# Patient Record
Sex: FEMALE | Race: WHITE | NOT HISPANIC OR LATINO | Employment: FULL TIME | URBAN - NONMETROPOLITAN AREA
[De-identification: names, ages, dates, MRNs, and addresses within clinical notes are randomized per-mention and may not be internally consistent; named-entity substitution may affect disease eponyms.]

---

## 2023-08-01 LAB
EXTERNAL ABO GROUPING: NORMAL
EXTERNAL ANTIBODY SCREEN: NEGATIVE
EXTERNAL HEPATITIS B SURFACE ANTIGEN: NEGATIVE
EXTERNAL RH FACTOR: POSITIVE
EXTERNAL RUBELLA QUALITATIVE: NORMAL
EXTERNAL SYPHILIS RPR SCREEN: NORMAL
HIV1 P24 AG SERPL QL IA: NORMAL

## 2024-02-15 ENCOUNTER — LAB (OUTPATIENT)
Dept: LAB | Facility: HOSPITAL | Age: 34
End: 2024-02-15
Payer: COMMERCIAL

## 2024-02-15 ENCOUNTER — TRANSCRIBE ORDERS (OUTPATIENT)
Dept: ADMINISTRATIVE | Facility: HOSPITAL | Age: 34
End: 2024-02-15
Payer: COMMERCIAL

## 2024-02-15 DIAGNOSIS — O24.415 GESTATIONAL DIABETES MELLITUS IN PREGNANCY, CONTROLLED BY ORAL HYPOGLYCEMIC DRUGS: Primary | ICD-10-CM

## 2024-02-15 DIAGNOSIS — O24.415 GESTATIONAL DIABETES MELLITUS IN PREGNANCY, CONTROLLED BY ORAL HYPOGLYCEMIC DRUGS: ICD-10-CM

## 2024-02-15 LAB
ALBUMIN SERPL-MCNC: 3.5 G/DL (ref 3.5–5.2)
ALBUMIN/GLOB SERPL: 1.1 G/DL
ALP SERPL-CCNC: 88 U/L (ref 39–117)
ALT SERPL W P-5'-P-CCNC: 16 U/L (ref 1–33)
ANION GAP SERPL CALCULATED.3IONS-SCNC: 10.8 MMOL/L (ref 5–15)
AST SERPL-CCNC: 14 U/L (ref 1–32)
BASOPHILS # BLD AUTO: 0.07 10*3/MM3 (ref 0–0.2)
BASOPHILS NFR BLD AUTO: 0.7 % (ref 0–1.5)
BILIRUB SERPL-MCNC: 0.3 MG/DL (ref 0–1.2)
BUN SERPL-MCNC: 9 MG/DL (ref 6–20)
BUN/CREAT SERPL: 14.1 (ref 7–25)
CALCIUM SPEC-SCNC: 9.1 MG/DL (ref 8.6–10.5)
CHLORIDE SERPL-SCNC: 106 MMOL/L (ref 98–107)
CO2 SERPL-SCNC: 21.2 MMOL/L (ref 22–29)
COLLECT DURATION TIME UR: 24 HRS
CREAT SERPL-MCNC: 0.64 MG/DL (ref 0.57–1)
CREAT UR-MCNC: 122.9 MG/DL
DEPRECATED RDW RBC AUTO: 43 FL (ref 37–54)
EGFRCR SERPLBLD CKD-EPI 2021: 120.6 ML/MIN/1.73
EOSINOPHIL # BLD AUTO: 0.16 10*3/MM3 (ref 0–0.4)
EOSINOPHIL NFR BLD AUTO: 1.5 % (ref 0.3–6.2)
ERYTHROCYTE [DISTWIDTH] IN BLOOD BY AUTOMATED COUNT: 14.3 % (ref 12.3–15.4)
GLOBULIN UR ELPH-MCNC: 3.3 GM/DL
GLUCOSE SERPL-MCNC: 108 MG/DL (ref 65–99)
HCT VFR BLD AUTO: 37.6 % (ref 34–46.6)
HGB BLD-MCNC: 12.4 G/DL (ref 12–15.9)
IMM GRANULOCYTES # BLD AUTO: 0.06 10*3/MM3 (ref 0–0.05)
IMM GRANULOCYTES NFR BLD AUTO: 0.6 % (ref 0–0.5)
LDH SERPL-CCNC: 130 U/L (ref 135–214)
LYMPHOCYTES # BLD AUTO: 1.44 10*3/MM3 (ref 0.7–3.1)
LYMPHOCYTES NFR BLD AUTO: 13.5 % (ref 19.6–45.3)
MCH RBC QN AUTO: 28 PG (ref 26.6–33)
MCHC RBC AUTO-ENTMCNC: 33 G/DL (ref 31.5–35.7)
MCV RBC AUTO: 84.9 FL (ref 79–97)
MONOCYTES # BLD AUTO: 0.65 10*3/MM3 (ref 0.1–0.9)
MONOCYTES NFR BLD AUTO: 6.1 % (ref 5–12)
NEUTROPHILS NFR BLD AUTO: 77.6 % (ref 42.7–76)
NEUTROPHILS NFR BLD AUTO: 8.31 10*3/MM3 (ref 1.7–7)
NRBC BLD AUTO-RTO: 0 /100 WBC (ref 0–0.2)
PLATELET # BLD AUTO: 238 10*3/MM3 (ref 140–450)
PMV BLD AUTO: 10.8 FL (ref 6–12)
POTASSIUM SERPL-SCNC: 4.8 MMOL/L (ref 3.5–5.2)
PROT 24H UR-MRATE: 172 MG/24HOURS (ref 0–150)
PROT ?TM UR-MCNC: 16.7 MG/DL
PROT SERPL-MCNC: 6.8 G/DL (ref 6–8.5)
PROT/CREAT UR: 135.9 MG/G CREA (ref 0–200)
RBC # BLD AUTO: 4.43 10*6/MM3 (ref 3.77–5.28)
SODIUM SERPL-SCNC: 138 MMOL/L (ref 136–145)
SPECIMEN VOL 24H UR: 1000 ML
URATE SERPL-MCNC: 5.4 MG/DL (ref 2.4–5.7)
WBC NRBC COR # BLD AUTO: 10.69 10*3/MM3 (ref 3.4–10.8)

## 2024-02-15 PROCEDURE — 84156 ASSAY OF PROTEIN URINE: CPT

## 2024-02-15 PROCEDURE — 81050 URINALYSIS VOLUME MEASURE: CPT

## 2024-02-15 PROCEDURE — 36415 COLL VENOUS BLD VENIPUNCTURE: CPT

## 2024-02-15 PROCEDURE — 85025 COMPLETE CBC W/AUTO DIFF WBC: CPT

## 2024-02-15 PROCEDURE — 80053 COMPREHEN METABOLIC PANEL: CPT

## 2024-02-15 PROCEDURE — 82570 ASSAY OF URINE CREATININE: CPT

## 2024-02-15 PROCEDURE — 83615 LACTATE (LD) (LDH) ENZYME: CPT

## 2024-02-15 PROCEDURE — 84550 ASSAY OF BLOOD/URIC ACID: CPT

## 2024-02-21 LAB — EXTERNAL GROUP B STREP ANTIGEN: POSITIVE

## 2024-02-26 ENCOUNTER — HOSPITAL ENCOUNTER (OUTPATIENT)
Facility: HOSPITAL | Age: 34
Discharge: HOME | End: 2024-02-26
Attending: OBSTETRICS & GYNECOLOGY | Admitting: OBSTETRICS & GYNECOLOGY
Payer: COMMERCIAL

## 2024-02-26 VITALS
TEMPERATURE: 97.3 F | SYSTOLIC BLOOD PRESSURE: 148 MMHG | HEIGHT: 64 IN | WEIGHT: 290.7 LBS | BODY MASS INDEX: 49.63 KG/M2 | RESPIRATION RATE: 18 BRPM | DIASTOLIC BLOOD PRESSURE: 82 MMHG | HEART RATE: 88 BPM

## 2024-02-26 PROBLEM — I10 HTN (HYPERTENSION): Status: ACTIVE | Noted: 2024-02-26

## 2024-02-26 LAB
ALBUMIN SERPL-MCNC: 3.4 G/DL (ref 3.5–5.2)
ALBUMIN/GLOB SERPL: 1.1 G/DL
ALP SERPL-CCNC: 98 U/L (ref 39–117)
ALT SERPL W P-5'-P-CCNC: 11 U/L (ref 1–33)
ANION GAP SERPL CALCULATED.3IONS-SCNC: 11.5 MMOL/L (ref 5–15)
AST SERPL-CCNC: 13 U/L (ref 1–32)
BILIRUB SERPL-MCNC: 0.2 MG/DL (ref 0–1.2)
BILIRUB UR QL STRIP: NEGATIVE
BUN SERPL-MCNC: 7 MG/DL (ref 6–20)
BUN/CREAT SERPL: 12.5 (ref 7–25)
CALCIUM SPEC-SCNC: 10.9 MG/DL (ref 8.6–10.5)
CHLORIDE SERPL-SCNC: 104 MMOL/L (ref 98–107)
CLARITY UR: CLEAR
CO2 SERPL-SCNC: 23.5 MMOL/L (ref 22–29)
COLOR UR: YELLOW
CREAT SERPL-MCNC: 0.56 MG/DL (ref 0.57–1)
DEPRECATED RDW RBC AUTO: 47 FL (ref 37–54)
EGFRCR SERPLBLD CKD-EPI 2021: 124.5 ML/MIN/1.73
ERYTHROCYTE [DISTWIDTH] IN BLOOD BY AUTOMATED COUNT: 15 % (ref 12.3–15.4)
GLOBULIN UR ELPH-MCNC: 3 GM/DL
GLUCOSE SERPL-MCNC: 90 MG/DL (ref 65–99)
GLUCOSE UR STRIP-MCNC: NEGATIVE MG/DL
HCT VFR BLD AUTO: 37.5 % (ref 34–46.6)
HGB BLD-MCNC: 12.7 G/DL (ref 12–15.9)
HGB UR QL STRIP.AUTO: NEGATIVE
KETONES UR QL STRIP: NEGATIVE
LDH SERPL-CCNC: 187 U/L (ref 135–214)
LEUKOCYTE ESTERASE UR QL STRIP.AUTO: NEGATIVE
MCH RBC QN AUTO: 29.6 PG (ref 26.6–33)
MCHC RBC AUTO-ENTMCNC: 33.9 G/DL (ref 31.5–35.7)
MCV RBC AUTO: 87.4 FL (ref 79–97)
NITRITE UR QL STRIP: NEGATIVE
PH UR STRIP.AUTO: 7.5 [PH] (ref 5–8)
PLATELET # BLD AUTO: 198 10*3/MM3 (ref 140–450)
PMV BLD AUTO: 11.1 FL (ref 6–12)
POTASSIUM SERPL-SCNC: 5.1 MMOL/L (ref 3.5–5.2)
PROT SERPL-MCNC: 6.4 G/DL (ref 6–8.5)
PROT UR QL STRIP: NEGATIVE
RBC # BLD AUTO: 4.29 10*6/MM3 (ref 3.77–5.28)
SODIUM SERPL-SCNC: 139 MMOL/L (ref 136–145)
SP GR UR STRIP: 1.01 (ref 1–1.03)
URATE SERPL-MCNC: 5.4 MG/DL (ref 2.4–5.7)
UROBILINOGEN UR QL STRIP: NORMAL
WBC NRBC COR # BLD AUTO: 10.6 10*3/MM3 (ref 3.4–10.8)

## 2024-02-26 PROCEDURE — 87086 URINE CULTURE/COLONY COUNT: CPT | Performed by: OBSTETRICS & GYNECOLOGY

## 2024-02-26 PROCEDURE — 59025 FETAL NON-STRESS TEST: CPT

## 2024-02-26 PROCEDURE — 36415 COLL VENOUS BLD VENIPUNCTURE: CPT | Performed by: OBSTETRICS & GYNECOLOGY

## 2024-02-26 PROCEDURE — 83615 LACTATE (LD) (LDH) ENZYME: CPT | Performed by: OBSTETRICS & GYNECOLOGY

## 2024-02-26 PROCEDURE — 85027 COMPLETE CBC AUTOMATED: CPT | Performed by: OBSTETRICS & GYNECOLOGY

## 2024-02-26 PROCEDURE — G0463 HOSPITAL OUTPT CLINIC VISIT: HCPCS

## 2024-02-26 PROCEDURE — 80053 COMPREHEN METABOLIC PANEL: CPT | Performed by: OBSTETRICS & GYNECOLOGY

## 2024-02-26 PROCEDURE — 81003 URINALYSIS AUTO W/O SCOPE: CPT | Performed by: OBSTETRICS & GYNECOLOGY

## 2024-02-26 PROCEDURE — 84550 ASSAY OF BLOOD/URIC ACID: CPT | Performed by: OBSTETRICS & GYNECOLOGY

## 2024-02-26 RX ORDER — PRENATAL VIT/IRON FUM/FOLIC AC 27MG-0.8MG
1 TABLET ORAL NIGHTLY
COMMUNITY

## 2024-02-26 NOTE — DISCHARGE INSTR - ACTIVITY
RETURN TO LABOR AND DELIVERY TOMORROW AT 4 PM WITH 24 HOUR URINE COLLECTION. MONITOR FETAL KICK COUNTS AND RETURN IF YOU NOTICE A DECREASE IN FETAL MOVEMENT.

## 2024-02-27 ENCOUNTER — PREP FOR SURGERY (OUTPATIENT)
Dept: OTHER | Facility: HOSPITAL | Age: 34
End: 2024-02-27
Payer: COMMERCIAL

## 2024-02-27 ENCOUNTER — HOSPITAL ENCOUNTER (INPATIENT)
Facility: HOSPITAL | Age: 34
LOS: 2 days | Discharge: HOME | End: 2024-03-01
Attending: OBSTETRICS & GYNECOLOGY | Admitting: OBSTETRICS & GYNECOLOGY
Payer: COMMERCIAL

## 2024-02-27 DIAGNOSIS — O36.63X0 MACROSOMIA OF FETUS AFFECTING MANAGEMENT OF MOTHER IN THIRD TRIMESTER, SINGLE OR UNSPECIFIED FETUS: Primary | ICD-10-CM

## 2024-02-27 DIAGNOSIS — O16.3 HYPERTENSION AFFECTING PREGNANCY IN THIRD TRIMESTER: Primary | ICD-10-CM

## 2024-02-27 PROBLEM — O16.9 HYPERTENSION AFFECTING PREGNANCY: Status: ACTIVE | Noted: 2024-02-27

## 2024-02-27 LAB
ABO GROUP BLD: NORMAL
ABO GROUP BLD: NORMAL
ALBUMIN SERPL-MCNC: 3.3 G/DL (ref 3.5–5.2)
ALBUMIN/GLOB SERPL: 1.1 G/DL
ALP SERPL-CCNC: 86 U/L (ref 39–117)
ALT SERPL W P-5'-P-CCNC: 10 U/L (ref 1–33)
AMPHET+METHAMPHET UR QL: NEGATIVE
AMPHETAMINES UR QL: NEGATIVE
ANION GAP SERPL CALCULATED.3IONS-SCNC: 10.6 MMOL/L (ref 5–15)
APTT PPP: 26.3 SECONDS (ref 26.5–34.5)
AST SERPL-CCNC: 15 U/L (ref 1–32)
BARBITURATES UR QL SCN: NEGATIVE
BASOPHILS # BLD AUTO: 0.06 10*3/MM3 (ref 0–0.2)
BASOPHILS NFR BLD AUTO: 0.6 % (ref 0–1.5)
BENZODIAZ UR QL SCN: NEGATIVE
BILIRUB SERPL-MCNC: 0.2 MG/DL (ref 0–1.2)
BLD GP AB SCN SERPL QL: NEGATIVE
BUN SERPL-MCNC: 7 MG/DL (ref 6–20)
BUN/CREAT SERPL: 10.9 (ref 7–25)
BUPRENORPHINE SERPL-MCNC: NEGATIVE NG/ML
CALCIUM SPEC-SCNC: 9.1 MG/DL (ref 8.6–10.5)
CANNABINOIDS SERPL QL: NEGATIVE
CHLORIDE SERPL-SCNC: 103 MMOL/L (ref 98–107)
CO2 SERPL-SCNC: 21.4 MMOL/L (ref 22–29)
COCAINE UR QL: NEGATIVE
COLLECT DURATION TIME UR: 24 HRS
CREAT SERPL-MCNC: 0.64 MG/DL (ref 0.57–1)
DEPRECATED RDW RBC AUTO: 49.2 FL (ref 37–54)
EGFRCR SERPLBLD CKD-EPI 2021: 120.6 ML/MIN/1.73
EOSINOPHIL # BLD AUTO: 0.12 10*3/MM3 (ref 0–0.4)
EOSINOPHIL NFR BLD AUTO: 1.1 % (ref 0.3–6.2)
ERYTHROCYTE [DISTWIDTH] IN BLOOD BY AUTOMATED COUNT: 15.3 % (ref 12.3–15.4)
FENTANYL UR-MCNC: NEGATIVE NG/ML
FIBRINOGEN PPP-MCNC: 636 MG/DL (ref 173–524)
GLOBULIN UR ELPH-MCNC: 2.9 GM/DL
GLUCOSE BLDC GLUCOMTR-MCNC: 116 MG/DL (ref 70–130)
GLUCOSE BLDC GLUCOMTR-MCNC: 86 MG/DL (ref 70–130)
GLUCOSE SERPL-MCNC: 85 MG/DL (ref 65–99)
HCT VFR BLD AUTO: 36.6 % (ref 34–46.6)
HGB BLD-MCNC: 12.1 G/DL (ref 12–15.9)
IMM GRANULOCYTES # BLD AUTO: 0.05 10*3/MM3 (ref 0–0.05)
IMM GRANULOCYTES NFR BLD AUTO: 0.5 % (ref 0–0.5)
INR PPP: 0.94 (ref 0.9–1.1)
LYMPHOCYTES # BLD AUTO: 1.61 10*3/MM3 (ref 0.7–3.1)
LYMPHOCYTES NFR BLD AUTO: 14.9 % (ref 19.6–45.3)
MCH RBC QN AUTO: 29.7 PG (ref 26.6–33)
MCHC RBC AUTO-ENTMCNC: 33.1 G/DL (ref 31.5–35.7)
MCV RBC AUTO: 89.7 FL (ref 79–97)
METHADONE UR QL SCN: NEGATIVE
MONOCYTES # BLD AUTO: 0.71 10*3/MM3 (ref 0.1–0.9)
MONOCYTES NFR BLD AUTO: 6.6 % (ref 5–12)
NEUTROPHILS NFR BLD AUTO: 76.3 % (ref 42.7–76)
NEUTROPHILS NFR BLD AUTO: 8.27 10*3/MM3 (ref 1.7–7)
NRBC BLD AUTO-RTO: 0 /100 WBC (ref 0–0.2)
OPIATES UR QL: NEGATIVE
OXYCODONE UR QL SCN: NEGATIVE
PCP UR QL SCN: NEGATIVE
PLATELET # BLD AUTO: 184 10*3/MM3 (ref 140–450)
PMV BLD AUTO: 10.9 FL (ref 6–12)
POTASSIUM SERPL-SCNC: 4.4 MMOL/L (ref 3.5–5.2)
PROT 24H UR-MRATE: 219.3 MG/24HOURS (ref 0–150)
PROT SERPL-MCNC: 6.2 G/DL (ref 6–8.5)
PROTHROMBIN TIME: 13 SECONDS (ref 12.1–14.7)
RBC # BLD AUTO: 4.08 10*6/MM3 (ref 3.77–5.28)
RH BLD: POSITIVE
RH BLD: POSITIVE
SODIUM SERPL-SCNC: 135 MMOL/L (ref 136–145)
SPECIMEN VOL 24H UR: 1700 ML
T&S EXPIRATION DATE: NORMAL
TRICYCLICS UR QL SCN: NEGATIVE
WBC NRBC COR # BLD AUTO: 10.82 10*3/MM3 (ref 3.4–10.8)

## 2024-02-27 PROCEDURE — 85730 THROMBOPLASTIN TIME PARTIAL: CPT | Performed by: OBSTETRICS & GYNECOLOGY

## 2024-02-27 PROCEDURE — G0378 HOSPITAL OBSERVATION PER HR: HCPCS

## 2024-02-27 PROCEDURE — 85610 PROTHROMBIN TIME: CPT | Performed by: OBSTETRICS & GYNECOLOGY

## 2024-02-27 PROCEDURE — 36415 COLL VENOUS BLD VENIPUNCTURE: CPT | Performed by: OBSTETRICS & GYNECOLOGY

## 2024-02-27 PROCEDURE — 81050 URINALYSIS VOLUME MEASURE: CPT | Performed by: OBSTETRICS & GYNECOLOGY

## 2024-02-27 PROCEDURE — 84156 ASSAY OF PROTEIN URINE: CPT | Performed by: OBSTETRICS & GYNECOLOGY

## 2024-02-27 PROCEDURE — 86901 BLOOD TYPING SEROLOGIC RH(D): CPT

## 2024-02-27 PROCEDURE — G0463 HOSPITAL OUTPT CLINIC VISIT: HCPCS

## 2024-02-27 PROCEDURE — 85025 COMPLETE CBC W/AUTO DIFF WBC: CPT | Performed by: OBSTETRICS & GYNECOLOGY

## 2024-02-27 PROCEDURE — 80307 DRUG TEST PRSMV CHEM ANLYZR: CPT | Performed by: OBSTETRICS & GYNECOLOGY

## 2024-02-27 PROCEDURE — 85384 FIBRINOGEN ACTIVITY: CPT | Performed by: OBSTETRICS & GYNECOLOGY

## 2024-02-27 PROCEDURE — 80053 COMPREHEN METABOLIC PANEL: CPT | Performed by: OBSTETRICS & GYNECOLOGY

## 2024-02-27 PROCEDURE — 86850 RBC ANTIBODY SCREEN: CPT | Performed by: OBSTETRICS & GYNECOLOGY

## 2024-02-27 PROCEDURE — 82948 REAGENT STRIP/BLOOD GLUCOSE: CPT

## 2024-02-27 PROCEDURE — 86901 BLOOD TYPING SEROLOGIC RH(D): CPT | Performed by: OBSTETRICS & GYNECOLOGY

## 2024-02-27 PROCEDURE — 86780 TREPONEMA PALLIDUM: CPT | Performed by: OBSTETRICS & GYNECOLOGY

## 2024-02-27 PROCEDURE — 86900 BLOOD TYPING SEROLOGIC ABO: CPT

## 2024-02-27 PROCEDURE — 86900 BLOOD TYPING SEROLOGIC ABO: CPT | Performed by: OBSTETRICS & GYNECOLOGY

## 2024-02-27 PROCEDURE — 82570 ASSAY OF URINE CREATININE: CPT | Performed by: OBSTETRICS & GYNECOLOGY

## 2024-02-27 PROCEDURE — 59025 FETAL NON-STRESS TEST: CPT

## 2024-02-27 RX ORDER — METOCLOPRAMIDE HYDROCHLORIDE 5 MG/ML
10 INJECTION INTRAMUSCULAR; INTRAVENOUS EVERY 6 HOURS PRN
Status: CANCELLED | OUTPATIENT
Start: 2024-02-27

## 2024-02-27 RX ORDER — HYDROXYZINE 50 MG/1
50 TABLET, FILM COATED ORAL NIGHTLY PRN
Status: CANCELLED | OUTPATIENT
Start: 2024-02-27

## 2024-02-27 RX ORDER — METHYLERGONOVINE MALEATE 0.2 MG/ML
200 INJECTION INTRAVENOUS AS NEEDED
Status: CANCELLED | OUTPATIENT
Start: 2024-02-27

## 2024-02-27 RX ORDER — SODIUM CHLORIDE 0.9 % (FLUSH) 0.9 %
10 SYRINGE (ML) INJECTION AS NEEDED
Status: CANCELLED | OUTPATIENT
Start: 2024-02-27

## 2024-02-27 RX ORDER — MAGNESIUM HYDROXIDE 1200 MG/15ML
3000 LIQUID ORAL ONCE AS NEEDED
Status: CANCELLED | OUTPATIENT
Start: 2024-02-27

## 2024-02-27 RX ORDER — ONDANSETRON 4 MG/1
4 TABLET, ORALLY DISINTEGRATING ORAL EVERY 6 HOURS PRN
Status: CANCELLED | OUTPATIENT
Start: 2024-02-27

## 2024-02-27 RX ORDER — NIFEDIPINE 10 MG/1
10-20 CAPSULE ORAL
Status: DISCONTINUED | OUTPATIENT
Start: 2024-02-27 | End: 2024-02-28

## 2024-02-27 RX ORDER — SODIUM CHLORIDE, SODIUM LACTATE, POTASSIUM CHLORIDE, CALCIUM CHLORIDE 600; 310; 30; 20 MG/100ML; MG/100ML; MG/100ML; MG/100ML
125 INJECTION, SOLUTION INTRAVENOUS CONTINUOUS
Status: CANCELLED | OUTPATIENT
Start: 2024-02-27

## 2024-02-27 RX ORDER — METFORMIN HYDROCHLORIDE 500 MG/1
500 TABLET, EXTENDED RELEASE ORAL 2 TIMES DAILY
COMMUNITY
End: 2024-03-01 | Stop reason: HOSPADM

## 2024-02-27 RX ORDER — GLYBURIDE 2.5 MG/1
1.25 TABLET ORAL
Status: ON HOLD | COMMUNITY
End: 2024-02-27

## 2024-02-27 RX ORDER — ONDANSETRON 2 MG/ML
4 INJECTION INTRAMUSCULAR; INTRAVENOUS EVERY 6 HOURS PRN
Status: CANCELLED | OUTPATIENT
Start: 2024-02-27

## 2024-02-27 RX ORDER — SODIUM CHLORIDE 0.9 % (FLUSH) 0.9 %
10 SYRINGE (ML) INJECTION EVERY 12 HOURS SCHEDULED
Status: CANCELLED | OUTPATIENT
Start: 2024-02-27

## 2024-02-27 RX ORDER — PROMETHAZINE HYDROCHLORIDE 12.5 MG/1
12.5 TABLET ORAL EVERY 6 HOURS PRN
Status: CANCELLED | OUTPATIENT
Start: 2024-02-27

## 2024-02-27 RX ORDER — PRENATAL VIT/IRON FUM/FOLIC AC 27MG-0.8MG
1 TABLET ORAL NIGHTLY
Status: CANCELLED | OUTPATIENT
Start: 2024-02-27

## 2024-02-27 RX ORDER — HYDRALAZINE HYDROCHLORIDE 20 MG/ML
5-10 INJECTION INTRAMUSCULAR; INTRAVENOUS
Status: DISCONTINUED | OUTPATIENT
Start: 2024-02-27 | End: 2024-02-28

## 2024-02-27 RX ORDER — MISOPROSTOL 100 UG/1
600 TABLET ORAL AS NEEDED
Status: CANCELLED | OUTPATIENT
Start: 2024-02-27

## 2024-02-27 RX ORDER — SODIUM CHLORIDE 9 MG/ML
40 INJECTION, SOLUTION INTRAVENOUS AS NEEDED
Status: DISCONTINUED | OUTPATIENT
Start: 2024-02-27 | End: 2024-02-28

## 2024-02-27 RX ORDER — SODIUM CHLORIDE 9 MG/ML
40 INJECTION, SOLUTION INTRAVENOUS AS NEEDED
Status: CANCELLED | OUTPATIENT
Start: 2024-02-27

## 2024-02-27 RX ORDER — OXYTOCIN/0.9 % SODIUM CHLORIDE 30/500 ML
250 PLASTIC BAG, INJECTION (ML) INTRAVENOUS CONTINUOUS
Status: CANCELLED | OUTPATIENT
Start: 2024-02-27 | End: 2024-02-27

## 2024-02-27 RX ORDER — LIDOCAINE HYDROCHLORIDE 10 MG/ML
0.5 INJECTION, SOLUTION INFILTRATION; PERINEURAL ONCE AS NEEDED
Status: CANCELLED | OUTPATIENT
Start: 2024-02-27

## 2024-02-27 RX ORDER — OXYTOCIN/0.9 % SODIUM CHLORIDE 30/500 ML
999 PLASTIC BAG, INJECTION (ML) INTRAVENOUS ONCE
Status: CANCELLED | OUTPATIENT
Start: 2024-02-27 | End: 2024-02-27

## 2024-02-27 RX ORDER — CARBOPROST TROMETHAMINE 250 UG/ML
250 INJECTION, SOLUTION INTRAMUSCULAR AS NEEDED
Status: CANCELLED | OUTPATIENT
Start: 2024-02-27

## 2024-02-27 RX ORDER — ACETAMINOPHEN 500 MG
1000 TABLET ORAL ONCE
Status: CANCELLED | OUTPATIENT
Start: 2024-02-27 | End: 2024-02-27

## 2024-02-27 RX ORDER — SODIUM CHLORIDE 0.9 % (FLUSH) 0.9 %
10 SYRINGE (ML) INJECTION AS NEEDED
Status: DISCONTINUED | OUTPATIENT
Start: 2024-02-27 | End: 2024-02-28

## 2024-02-27 RX ORDER — PROMETHAZINE HYDROCHLORIDE 12.5 MG/1
12.5 SUPPOSITORY RECTAL EVERY 6 HOURS PRN
Status: CANCELLED | OUTPATIENT
Start: 2024-02-27

## 2024-02-27 RX ORDER — KETOROLAC TROMETHAMINE 30 MG/ML
30 INJECTION, SOLUTION INTRAMUSCULAR; INTRAVENOUS ONCE
Status: CANCELLED | OUTPATIENT
Start: 2024-02-27 | End: 2024-02-27

## 2024-02-27 RX ORDER — LABETALOL HYDROCHLORIDE 5 MG/ML
20-80 INJECTION, SOLUTION INTRAVENOUS
Status: DISCONTINUED | OUTPATIENT
Start: 2024-02-27 | End: 2024-02-28

## 2024-02-27 NOTE — NON STRESS TEST
Joanna Hernandez, a  at 36w4d with an NICOLE of 3/22/2024, by Ultrasound, was seen at [facility] LABOR DELIVERY for a nonstress test.    Chief Complaint   Patient presents with    Non-stress Test     PRESENTS FOR NST AND RETURN 24 HOUR URINE.  PT DENIES ANY UC'S, NO LOF AND NO VAG BLEEDING.  PT HAS +FM.       Patient Active Problem List   Diagnosis    HTN (hypertension)    Hypertension affecting pregnancy       Start Time: 1605  Stop Time: 1625    Interpretation A  Nonstress Test Interpretation A: Reactive  Comments A: VERIFIED BY OLYA GUZMÁN RN

## 2024-02-27 NOTE — PLAN OF CARE
Goal Outcome Evaluation:  Plan of Care Reviewed With: patient, spouse        Progress: improving  Outcome Evaluation: IV SALINE LOCKED WITH NO REDNESS NOTED TO SITE.  PT TOLERATED A REG DIET.  REPORT TO IVANIA PALMA RN

## 2024-02-28 ENCOUNTER — ANESTHESIA (OUTPATIENT)
Dept: LABOR AND DELIVERY | Facility: HOSPITAL | Age: 34
End: 2024-02-28
Payer: COMMERCIAL

## 2024-02-28 ENCOUNTER — ANESTHESIA EVENT (OUTPATIENT)
Dept: LABOR AND DELIVERY | Facility: HOSPITAL | Age: 34
End: 2024-02-28
Payer: COMMERCIAL

## 2024-02-28 PROBLEM — O24.419 GESTATIONAL DIABETES: Status: ACTIVE | Noted: 2024-02-28

## 2024-02-28 LAB
BACTERIA SPEC AEROBE CULT: NO GROWTH
CREAT UR-MCNC: 124.6 MG/DL
GLUCOSE BLDC GLUCOMTR-MCNC: 106 MG/DL (ref 70–130)
GLUCOSE BLDC GLUCOMTR-MCNC: 94 MG/DL (ref 70–130)
GLUCOSE BLDC GLUCOMTR-MCNC: 98 MG/DL (ref 70–130)
PROT ?TM UR-MCNC: 21.5 MG/DL
PROT/CREAT UR: 172.6 MG/G CREA (ref 0–200)
T PALLIDUM IGG SER QL: NORMAL

## 2024-02-28 PROCEDURE — 25010000002 CEFAZOLIN PER 500 MG: Performed by: OBSTETRICS & GYNECOLOGY

## 2024-02-28 PROCEDURE — 59025 FETAL NON-STRESS TEST: CPT

## 2024-02-28 PROCEDURE — 25010000002 KETOROLAC TROMETHAMINE PER 15 MG: Performed by: OBSTETRICS & GYNECOLOGY

## 2024-02-28 PROCEDURE — 82948 REAGENT STRIP/BLOOD GLUCOSE: CPT

## 2024-02-28 PROCEDURE — 25010000002 ONDANSETRON PER 1 MG: Performed by: NURSE ANESTHETIST, CERTIFIED REGISTERED

## 2024-02-28 PROCEDURE — 25810000003 LACTATED RINGERS PER 1000 ML: Performed by: NURSE ANESTHETIST, CERTIFIED REGISTERED

## 2024-02-28 PROCEDURE — 25010000002 DEXAMETHASONE PER 1 MG: Performed by: ANESTHESIOLOGY

## 2024-02-28 PROCEDURE — 25010000002 FENTANYL CITRATE (PF) 50 MCG/ML SOLUTION: Performed by: NURSE ANESTHETIST, CERTIFIED REGISTERED

## 2024-02-28 PROCEDURE — 25010000002 ROPIVACAINE PER 1 MG: Performed by: ANESTHESIOLOGY

## 2024-02-28 PROCEDURE — 25010000002 BUPIVACAINE PF 0.75 % SOLUTION: Performed by: NURSE ANESTHETIST, CERTIFIED REGISTERED

## 2024-02-28 PROCEDURE — 25010000002 MORPHINE PER 10 MG: Performed by: NURSE ANESTHETIST, CERTIFIED REGISTERED

## 2024-02-28 RX ORDER — OXYCODONE HYDROCHLORIDE 10 MG/1
10 TABLET ORAL EVERY 4 HOURS PRN
Status: DISCONTINUED | OUTPATIENT
Start: 2024-02-28 | End: 2024-03-01 | Stop reason: HOSPADM

## 2024-02-28 RX ORDER — MORPHINE SULFATE 0.5 MG/ML
INJECTION, SOLUTION EPIDURAL; INTRATHECAL; INTRAVENOUS AS NEEDED
Status: DISCONTINUED | OUTPATIENT
Start: 2024-02-28 | End: 2024-02-28 | Stop reason: SURG

## 2024-02-28 RX ORDER — PHENYLEPHRINE HCL IN 0.9% NACL 1 MG/10 ML
SYRINGE (ML) INTRAVENOUS AS NEEDED
Status: DISCONTINUED | OUTPATIENT
Start: 2024-02-28 | End: 2024-02-28 | Stop reason: SURG

## 2024-02-28 RX ORDER — SODIUM CHLORIDE 0.9 % (FLUSH) 0.9 %
10 SYRINGE (ML) INJECTION EVERY 12 HOURS SCHEDULED
Status: DISCONTINUED | OUTPATIENT
Start: 2024-02-28 | End: 2024-02-28

## 2024-02-28 RX ORDER — SODIUM CHLORIDE 0.9 % (FLUSH) 0.9 %
10 SYRINGE (ML) INJECTION AS NEEDED
Status: DISCONTINUED | OUTPATIENT
Start: 2024-02-28 | End: 2024-02-28

## 2024-02-28 RX ORDER — ONDANSETRON 2 MG/ML
INJECTION INTRAMUSCULAR; INTRAVENOUS AS NEEDED
Status: DISCONTINUED | OUTPATIENT
Start: 2024-02-28 | End: 2024-02-28 | Stop reason: SURG

## 2024-02-28 RX ORDER — BUPIVACAINE HYDROCHLORIDE 7.5 MG/ML
INJECTION, SOLUTION EPIDURAL; RETROBULBAR AS NEEDED
Status: DISCONTINUED | OUTPATIENT
Start: 2024-02-28 | End: 2024-02-28 | Stop reason: SURG

## 2024-02-28 RX ORDER — METHYLERGONOVINE MALEATE 0.2 MG/ML
200 INJECTION INTRAVENOUS ONCE AS NEEDED
Status: DISCONTINUED | OUTPATIENT
Start: 2024-02-28 | End: 2024-03-01 | Stop reason: HOSPADM

## 2024-02-28 RX ORDER — ENOXAPARIN SODIUM 100 MG/ML
40 INJECTION SUBCUTANEOUS EVERY 12 HOURS
Status: DISCONTINUED | OUTPATIENT
Start: 2024-02-29 | End: 2024-03-01 | Stop reason: HOSPADM

## 2024-02-28 RX ORDER — LIDOCAINE HYDROCHLORIDE 10 MG/ML
0.5 INJECTION, SOLUTION INFILTRATION; PERINEURAL ONCE AS NEEDED
Status: DISCONTINUED | OUTPATIENT
Start: 2024-02-28 | End: 2024-02-28 | Stop reason: HOSPADM

## 2024-02-28 RX ORDER — OXYTOCIN/0.9 % SODIUM CHLORIDE 30/500 ML
999 PLASTIC BAG, INJECTION (ML) INTRAVENOUS ONCE
Status: DISCONTINUED | OUTPATIENT
Start: 2024-02-28 | End: 2024-02-28 | Stop reason: HOSPADM

## 2024-02-28 RX ORDER — POLYETHYLENE GLYCOL 3350 17 G/17G
17 POWDER, FOR SOLUTION ORAL DAILY
Status: DISCONTINUED | OUTPATIENT
Start: 2024-02-29 | End: 2024-03-01 | Stop reason: HOSPADM

## 2024-02-28 RX ORDER — PROMETHAZINE HYDROCHLORIDE 25 MG/1
12.5 TABLET ORAL EVERY 4 HOURS PRN
Status: DISCONTINUED | OUTPATIENT
Start: 2024-02-28 | End: 2024-03-01 | Stop reason: HOSPADM

## 2024-02-28 RX ORDER — SODIUM CHLORIDE 9 MG/ML
40 INJECTION, SOLUTION INTRAVENOUS AS NEEDED
Status: DISCONTINUED | OUTPATIENT
Start: 2024-02-28 | End: 2024-02-28

## 2024-02-28 RX ORDER — ACETAMINOPHEN 325 MG/1
650 TABLET ORAL EVERY 6 HOURS
Status: DISCONTINUED | OUTPATIENT
Start: 2024-03-01 | End: 2024-03-01 | Stop reason: HOSPADM

## 2024-02-28 RX ORDER — ONDANSETRON 2 MG/ML
4 INJECTION INTRAMUSCULAR; INTRAVENOUS ONCE AS NEEDED
Status: DISCONTINUED | OUTPATIENT
Start: 2024-02-28 | End: 2024-02-28

## 2024-02-28 RX ORDER — ONDANSETRON 4 MG/1
4 TABLET, ORALLY DISINTEGRATING ORAL EVERY 6 HOURS PRN
Status: DISCONTINUED | OUTPATIENT
Start: 2024-02-28 | End: 2024-03-01 | Stop reason: HOSPADM

## 2024-02-28 RX ORDER — IBUPROFEN 600 MG/1
600 TABLET ORAL EVERY 6 HOURS
Status: DISCONTINUED | OUTPATIENT
Start: 2024-02-29 | End: 2024-03-01 | Stop reason: HOSPADM

## 2024-02-28 RX ORDER — HYDROXYZINE HYDROCHLORIDE 25 MG/1
50 TABLET, FILM COATED ORAL EVERY 6 HOURS PRN
Status: DISCONTINUED | OUTPATIENT
Start: 2024-02-28 | End: 2024-03-01 | Stop reason: HOSPADM

## 2024-02-28 RX ORDER — MORPHINE SULFATE 2 MG/ML
2 INJECTION, SOLUTION INTRAMUSCULAR; INTRAVENOUS
Status: DISCONTINUED | OUTPATIENT
Start: 2024-02-28 | End: 2024-02-28

## 2024-02-28 RX ORDER — ACETAMINOPHEN 500 MG
1000 TABLET ORAL ONCE
Status: COMPLETED | OUTPATIENT
Start: 2024-02-28 | End: 2024-02-28

## 2024-02-28 RX ORDER — SIMETHICONE 80 MG
80 TABLET,CHEWABLE ORAL 4 TIMES DAILY PRN
Status: DISCONTINUED | OUTPATIENT
Start: 2024-02-28 | End: 2024-03-01 | Stop reason: HOSPADM

## 2024-02-28 RX ORDER — KETOROLAC TROMETHAMINE 30 MG/ML
15 INJECTION, SOLUTION INTRAMUSCULAR; INTRAVENOUS EVERY 6 HOURS
Status: DISPENSED | OUTPATIENT
Start: 2024-02-28 | End: 2024-02-29

## 2024-02-28 RX ORDER — SODIUM CHLORIDE, SODIUM LACTATE, POTASSIUM CHLORIDE, CALCIUM CHLORIDE 600; 310; 30; 20 MG/100ML; MG/100ML; MG/100ML; MG/100ML
125 INJECTION, SOLUTION INTRAVENOUS CONTINUOUS
Status: DISCONTINUED | OUTPATIENT
Start: 2024-02-28 | End: 2024-02-28

## 2024-02-28 RX ORDER — CARBOPROST TROMETHAMINE 250 UG/ML
250 INJECTION, SOLUTION INTRAMUSCULAR AS NEEDED
Status: DISCONTINUED | OUTPATIENT
Start: 2024-02-28 | End: 2024-03-01 | Stop reason: HOSPADM

## 2024-02-28 RX ORDER — ONDANSETRON 2 MG/ML
4 INJECTION INTRAMUSCULAR; INTRAVENOUS EVERY 6 HOURS PRN
Status: DISCONTINUED | OUTPATIENT
Start: 2024-02-28 | End: 2024-03-01 | Stop reason: HOSPADM

## 2024-02-28 RX ORDER — SODIUM CHLORIDE, SODIUM LACTATE, POTASSIUM CHLORIDE, CALCIUM CHLORIDE 600; 310; 30; 20 MG/100ML; MG/100ML; MG/100ML; MG/100ML
125 INJECTION, SOLUTION INTRAVENOUS ONCE
Status: DISCONTINUED | OUTPATIENT
Start: 2024-02-28 | End: 2024-02-28

## 2024-02-28 RX ORDER — FENTANYL CITRATE 50 UG/ML
INJECTION, SOLUTION INTRAMUSCULAR; INTRAVENOUS AS NEEDED
Status: DISCONTINUED | OUTPATIENT
Start: 2024-02-28 | End: 2024-02-28 | Stop reason: SURG

## 2024-02-28 RX ORDER — METOCLOPRAMIDE 10 MG/1
10 TABLET ORAL ONCE
Status: DISCONTINUED | OUTPATIENT
Start: 2024-02-28 | End: 2024-03-01 | Stop reason: HOSPADM

## 2024-02-28 RX ORDER — SODIUM CHLORIDE, SODIUM LACTATE, POTASSIUM CHLORIDE, CALCIUM CHLORIDE 600; 310; 30; 20 MG/100ML; MG/100ML; MG/100ML; MG/100ML
INJECTION, SOLUTION INTRAVENOUS CONTINUOUS PRN
Status: DISCONTINUED | OUTPATIENT
Start: 2024-02-28 | End: 2024-02-28 | Stop reason: SURG

## 2024-02-28 RX ORDER — MIDAZOLAM HYDROCHLORIDE 1 MG/ML
1 INJECTION INTRAMUSCULAR; INTRAVENOUS
Status: DISCONTINUED | OUTPATIENT
Start: 2024-02-28 | End: 2024-02-28

## 2024-02-28 RX ORDER — DIPHENHYDRAMINE HYDROCHLORIDE 50 MG/ML
25 INJECTION INTRAMUSCULAR; INTRAVENOUS EVERY 4 HOURS PRN
Status: DISCONTINUED | OUTPATIENT
Start: 2024-02-28 | End: 2024-02-28

## 2024-02-28 RX ORDER — ACETAMINOPHEN 500 MG
1000 TABLET ORAL EVERY 6 HOURS
Status: COMPLETED | OUTPATIENT
Start: 2024-02-29 | End: 2024-02-29

## 2024-02-28 RX ORDER — OXYTOCIN/0.9 % SODIUM CHLORIDE 30/500 ML
PLASTIC BAG, INJECTION (ML) INTRAVENOUS CONTINUOUS PRN
Status: DISCONTINUED | OUTPATIENT
Start: 2024-02-28 | End: 2024-02-28 | Stop reason: SURG

## 2024-02-28 RX ORDER — OXYCODONE HYDROCHLORIDE 5 MG/1
5 TABLET ORAL EVERY 4 HOURS PRN
Status: DISCONTINUED | OUTPATIENT
Start: 2024-02-28 | End: 2024-03-01 | Stop reason: HOSPADM

## 2024-02-28 RX ORDER — OXYTOCIN/0.9 % SODIUM CHLORIDE 30/500 ML
250 PLASTIC BAG, INJECTION (ML) INTRAVENOUS CONTINUOUS
Status: ACTIVE | OUTPATIENT
Start: 2024-02-28 | End: 2024-02-28

## 2024-02-28 RX ORDER — EPHEDRINE SULFATE 5 MG/ML
INJECTION INTRAVENOUS AS NEEDED
Status: DISCONTINUED | OUTPATIENT
Start: 2024-02-28 | End: 2024-02-28 | Stop reason: SURG

## 2024-02-28 RX ORDER — DEXAMETHASONE SODIUM PHOSPHATE 4 MG/ML
INJECTION, SOLUTION INTRA-ARTICULAR; INTRALESIONAL; INTRAMUSCULAR; INTRAVENOUS; SOFT TISSUE
Status: DISCONTINUED | OUTPATIENT
Start: 2024-02-28 | End: 2024-02-28 | Stop reason: SURG

## 2024-02-28 RX ORDER — OXYTOCIN/0.9 % SODIUM CHLORIDE 30/500 ML
125 PLASTIC BAG, INJECTION (ML) INTRAVENOUS ONCE AS NEEDED
Status: DISCONTINUED | OUTPATIENT
Start: 2024-02-28 | End: 2024-03-01 | Stop reason: HOSPADM

## 2024-02-28 RX ORDER — ROPIVACAINE HYDROCHLORIDE 5 MG/ML
INJECTION, SOLUTION EPIDURAL; INFILTRATION; PERINEURAL
Status: DISCONTINUED | OUTPATIENT
Start: 2024-02-28 | End: 2024-02-28 | Stop reason: SURG

## 2024-02-28 RX ORDER — MISOPROSTOL 100 UG/1
600 TABLET ORAL AS NEEDED
Status: DISCONTINUED | OUTPATIENT
Start: 2024-02-28 | End: 2024-02-28 | Stop reason: HOSPADM

## 2024-02-28 RX ORDER — NALOXONE HCL 0.4 MG/ML
0.1 VIAL (ML) INJECTION
Status: DISCONTINUED | OUTPATIENT
Start: 2024-02-28 | End: 2024-02-28

## 2024-02-28 RX ORDER — HYDROCORTISONE 25 MG/G
CREAM TOPICAL 3 TIMES DAILY PRN
Status: DISCONTINUED | OUTPATIENT
Start: 2024-02-28 | End: 2024-03-01 | Stop reason: HOSPADM

## 2024-02-28 RX ORDER — MAGNESIUM HYDROXIDE 1200 MG/15ML
LIQUID ORAL AS NEEDED
Status: DISCONTINUED | OUTPATIENT
Start: 2024-02-28 | End: 2024-03-01 | Stop reason: HOSPADM

## 2024-02-28 RX ORDER — KETOROLAC TROMETHAMINE 30 MG/ML
30 INJECTION, SOLUTION INTRAMUSCULAR; INTRAVENOUS ONCE
Status: COMPLETED | OUTPATIENT
Start: 2024-02-28 | End: 2024-02-28

## 2024-02-28 RX ORDER — DIPHENHYDRAMINE HCL 25 MG
25 CAPSULE ORAL EVERY 4 HOURS PRN
Status: DISCONTINUED | OUTPATIENT
Start: 2024-02-28 | End: 2024-02-28

## 2024-02-28 RX ADMIN — ONDANSETRON 4 MG: 2 INJECTION INTRAMUSCULAR; INTRAVENOUS at 16:05

## 2024-02-28 RX ADMIN — DEXAMETHASONE SODIUM PHOSPHATE 8 MG: 4 INJECTION, SOLUTION INTRA-ARTICULAR; INTRALESIONAL; INTRAMUSCULAR; INTRAVENOUS; SOFT TISSUE at 17:25

## 2024-02-28 RX ADMIN — Medication 100 MCG: at 16:27

## 2024-02-28 RX ADMIN — FENTANYL CITRATE 20 MCG: 50 INJECTION INTRAMUSCULAR; INTRAVENOUS at 16:05

## 2024-02-28 RX ADMIN — ACETAMINOPHEN 1000 MG: 500 TABLET ORAL at 15:12

## 2024-02-28 RX ADMIN — KETOROLAC TROMETHAMINE 30 MG: 30 INJECTION, SOLUTION INTRAMUSCULAR; INTRAVENOUS at 17:37

## 2024-02-28 RX ADMIN — METFORMIN HYDROCHLORIDE 500 MG: 500 TABLET ORAL at 08:13

## 2024-02-28 RX ADMIN — SODIUM CHLORIDE, POTASSIUM CHLORIDE, SODIUM LACTATE AND CALCIUM CHLORIDE: 600; 310; 30; 20 INJECTION, SOLUTION INTRAVENOUS at 15:59

## 2024-02-28 RX ADMIN — CEFAZOLIN 2 G: 2 INJECTION, POWDER, FOR SOLUTION INTRAMUSCULAR; INTRAVENOUS at 15:59

## 2024-02-28 RX ADMIN — Medication 50 MCG: at 16:18

## 2024-02-28 RX ADMIN — MORPHINE SULFATE 0.2 MG: 0.5 INJECTION EPIDURAL; INTRATHECAL; INTRAVENOUS at 16:05

## 2024-02-28 RX ADMIN — Medication 100 MCG: at 16:13

## 2024-02-28 RX ADMIN — BUPIVACAINE HYDROCHLORIDE 1.8 ML: 7.5 INJECTION, SOLUTION EPIDURAL; RETROBULBAR at 16:05

## 2024-02-28 RX ADMIN — ROPIVACAINE HYDROCHLORIDE 150 MG: 5 INJECTION, SOLUTION EPIDURAL; INFILTRATION; PERINEURAL at 17:25

## 2024-02-28 RX ADMIN — Medication 100 MCG: at 16:08

## 2024-02-28 RX ADMIN — ACETAMINOPHEN 1000 MG: 500 TABLET ORAL at 23:50

## 2024-02-28 RX ADMIN — Medication 500 ML/HR: at 17:08

## 2024-02-28 RX ADMIN — Medication 999 ML/HR: at 16:31

## 2024-02-28 NOTE — ANESTHESIA PROCEDURE NOTES
"Peripheral Block      Patient reassessed immediately prior to procedure    Patient location during procedure: OR  Start time: 2/28/2024 5:20 PM  Stop time: 2/28/2024 5:25 PM  Reason for block: at surgeon's request and post-op pain management  Performed by  CRNA/CAA: Beth Newsome CRNA  Preanesthetic Checklist  Completed: patient identified, IV checked, site marked, risks and benefits discussed, surgical consent, monitors and equipment checked, pre-op evaluation and timeout performed  Prep:  Pt Position: supine  Sterile barriers:cap, gloves, sterile barriers and mask  Prep: ChloraPrep  Patient monitoring: blood pressure monitoring, continuous pulse oximetry and EKG  Procedure    Nursing cardiac assessment comments yes: Sedation, GA, Spinal,Epidural   Performed under: general  Guidance:ultrasound guided    ULTRASOUND INTERPRETATION.  Using ultrasound guidance a 20 G (20g 4\" Stimuplex) gauge needle was placed in close proximity to the nerve, at which point, under ultrasound guidance anesthetic was injected in the area of the nerve and spread of the anesthesia was seen on ultrasound in close proximity thereto.  There were no abnormalities seen on ultrasound; a digital image was taken; and the patient tolerated the procedure with no complications. Images:still images obtained (picturers would not print)    Laterality:Bilateral  Block Type:TAP  Injection Technique:single-shot  Needle Type:short-bevel  Needle Gauge:20 G  Resistance on Injection: none    Medications Used: dexamethasone (DECADRON) injection - Peripheral Nerve, Transversus Abdominus Plane   8 mg - 2/28/2024 5:25:00 PM  ropivacaine (NAROPIN) injection 0.5 % - Peripheral Nerve   150 mg - 2/28/2024 5:25:00 PM      Medications  Preservative Free Saline:30ml  Comment:Block Injection:  Total volume divided equally between both injection sites      Post Assessment  Injection Assessment: negative aspiration for heme, incremental injection and no paresthesia " on injection  Patient Tolerance:comfortable throughout block  Complications:no  Additional Notes  The pt was in the supine position under general anesthesia    Under Ultrasound guidance, a BBraun 4inch 360 degree needle was advanced with Normal Saline hydro dissection of tissue.  The Internal Oblique and Transversus Abdominus muscles where visualized.  At or before the aponeurosis of Internal Oblique, local anesthetic spread was visualized in the Transversus Abdominus Plane. Injection was made incrementally with aspiration every 5 mls.  There was no  intravascular injection,  injection pressure was normal, there was no neural injection, and the procedure was completed without difficulty. The same procedure was completed for left and right sided lateral tap blocks.

## 2024-02-28 NOTE — PLAN OF CARE
Goal Outcome Evaluation:  Plan of Care Reviewed With: patient           Outcome Evaluation: IV saline locked, site CDI, no redness noted. Pt ambulated independently this shift. Pt voices no complaints at this time. FHT and BPs monitored per order. Will continue to follow plan of care.

## 2024-02-28 NOTE — ANESTHESIA PROCEDURE NOTES
Spinal Block      Patient reassessed immediately prior to procedure    Start Time: 2/28/2024 4:03 PM  Stop Time: 2/28/2024 4:05 PM  Performed By  CRNA/CAA: Beth Newsome CRNA  Preanesthetic Checklist  Completed: patient identified, IV checked, site marked, risks and benefits discussed, surgical consent, monitors and equipment checked, pre-op evaluation and timeout performed  Spinal Block Prep:  Patient Position:sitting  Sterile Tech:cap, gloves, sterile barriers and mask  Prep:Betadine  Patient Monitoring:EKG, continuous pulse oximetry and blood pressure monitoring    Spinal Block Procedure  Approach:midline  Guidance:landmark technique and palpation technique  Location:L3-L4  Needle Type:Pencan  Needle Gauge:24 G  Placement of Spinal needle event:cerebrospinal fluid aspirated    Fluid Appearance:clear     Post Assessment  Patient Tolerance:patient tolerated the procedure well with no apparent complications  Complications no  Additional Notes  Sitting SOB.  LR bolus infusing.  Std.  Monitors.  VSS.  L3-4 ID'D.  LI  Lidocaine 1% to L3-4 infiultrated.  #18 g Introducer to L3-4.  #24 g pencan through introducer x 1 stick, with redirection. +csf, -heme, -parathesia.  T4 level achieved.

## 2024-02-28 NOTE — NON STRESS TEST
Joanna Hernandez, a  at 37w5d with an NICOLE of 3/22/2024, by Ultrasound, was seen at [facility] LABOR DELIVERY for a nonstress test.    Chief Complaint   Patient presents with    Non-stress Test     PRESENTS FOR NST AND RETURN 24 HOUR URINE.  PT DENIES ANY UC'S, NO LOF AND NO VAG BLEEDING.  PT HAS +FM.       Patient Active Problem List   Diagnosis    HTN (hypertension)    Hypertension affecting pregnancy       Start Time: 816  Stop Time: 900    Interpretation A  Nonstress Test Interpretation A: Reactive  Comments A: VERIFIED BY OLYA GUZMÁN RN

## 2024-02-28 NOTE — PAYOR COMM NOTE
"Joanna Cortes (33 y.o. Female)       Date of Birth   1990    Social Security Number       Address   [address] [facility] [address]    Home Phone   [phone]    MRN   [id number]       Latter day   None    Marital Status                               Admission Date   2/27/24    Admission Type   Elective    Admitting Provider   Aden Hull DO    Attending Provider   Aden Hull DO    Department, Room/Bed   [facility] LABOR DELIVERY, L226/1       Discharge Date       Discharge Disposition       Discharge Destination                                 Attending Provider: Aden Hull DO    Allergies: Penicillins    Isolation: None   Infection: None   Code Status: CPR    Ht: 162.6 cm (64\")   Wt: 131 kg (289 lb 3.2 oz)    Admission Cmt: None   Principal Problem: Hypertension affecting pregnancy [O16.9]                   Active Insurance as of 2/27/2024       Primary Coverage       Payor Plan Insurance Group Employer/Plan Group    [employer]OSS [employer]UE [employer] PPO [id number]       Payor Plan Address Payor Plan Phone Number Payor Plan Fax Number Effective Dates    [address] [phone]  12/1/2022 - None Entered    [facility] [id number]         Subscriber Name Subscriber Birth Date Member ID       JOANNA CORTES 1990 [id number]                     Emergency Contacts        (Rel.) Home Phone Work Phone Mobile Phone    JUAREZ CORTES (Spouse) [phone] -- [phone]              History & Physical    No notes of this type exist for this encounter.       Vital Signs (last day)       Date/Time Temp Temp src Pulse Resp BP Patient Position SpO2    02/28/24 0815 -- -- 80 18 136/68 -- --    02/28/24 0701 -- -- 85 -- 145/85 -- --    02/28/24 0626 -- -- 80 -- 131/67 -- --    02/28/24 0502 -- -- 75 -- 127/63 -- --    02/28/24 0402 -- -- 69 -- 109/63 -- --    02/28/24 0342 -- -- 72 -- 119/62 -- --    02/28/24 0318 -- -- 77 -- " 128/57 -- --    02/28/24 0247 -- -- 71 -- 132/64 -- --    02/28/24 0232 -- -- 80 -- 135/67 -- --    02/28/24 0217 -- -- 77 -- 160/80 -- --    02/28/24 0202 -- -- 77 -- 156/80 -- --    02/28/24 0146 -- -- 82 -- 146/75 -- --    02/28/24 0132 -- -- 79 -- 153/77 -- --    02/28/24 0122 -- -- 86 -- 178/95 -- --    02/27/24 2252 -- -- 79 18 135/79 Lying --    02/27/24 2143 -- -- 107 -- 145/82 -- --    02/27/24 2047 -- -- 90 -- 139/69 -- --    02/27/24 2032 -- -- 88 -- 137/70 -- --    02/27/24 2016 -- -- 91 -- 143/81 -- --    02/27/24 2002 -- -- 93 -- 149/76 -- --    02/27/24 1946 -- -- 88 -- 159/72 -- --    02/27/24 1936 -- -- 83 -- 166/88 -- --    02/27/24 1923 98 (36.7) Oral 88 18 160/80 Sitting --    02/27/24 1803 -- -- 87 -- 137/80 -- --    02/27/24 1724 -- -- -- -- 0/0 -- --    02/27/24 1713 -- -- 92 -- 149/73 -- --    02/27/24 1703 -- -- 91 -- 144/76 -- --    02/27/24 1653 -- -- 90 -- 138/70 -- --    02/27/24 1651 -- -- 90 -- 154/76 -- --    02/27/24 1650 -- -- 90 -- 157/81 -- --    02/27/24 1649 -- -- 93 -- 158/82 -- --    02/27/24 1634 -- -- 91 -- 183/78 -- --    02/27/24 1624 -- -- 91 -- 184/90 -- --    02/27/24 1614 -- -- 92 -- 182/74 -- --    02/27/24 1603 -- -- 86 -- 178/83 -- --    02/27/24 1553 98.2 (36.8) Oral -- 18 -- Lying --          Intake & Output (last day)       None          Medication Administration Report for Joanna Hernandez as of 02/28/24 0836     Legend:    Given Hold Not Given Due Canceled Entry Other Actions    Time Time (Time) Time Time-Action         Discontinued     Completed     Future     MAR Hold     Linked             Medications 02/27/24 02/28/24      hydrALAZINE (APRESOLINE) injection 5-10 mg  Dose: 5-10 mg  Freq: Every 20 Minutes PRN Route: IV  PRN Reason: High Blood Pressure  PRN Comment: See admin instructions  Start: 02/27/24 1645   Admin Instructions:   Administer Hydralazine IV as Needed For Systolic Blood Pressure Greater Than 160  or Diastolic Blood Pressure Greater Than 110 .  Maximum cumulative dose 20mg.  Initial drug selection to be determined by provider.   1. Initial Dose: Hydralazine 5 mg IV Over 2 Minutes  2. If Blood Pressure Does Not Meet Parameters 20 Minutes After Administration: Administer Hydralazine 10 mg IV.  3. If severe BP persists, physician will determine to continue with additional 5mg hydralazine or switch to labetalol.  Caution: Look alike/sound alike drug alert        Or  labetalol (NORMODYNE,TRANDATE) injection 20-80 mg  Dose: 20-80 mg  Freq: Every 10 Minutes PRN Route: IV  PRN Reason: High Blood Pressure  PRN Comment: See Admin Instructions  Start: 02/27/24 1645   Admin Instructions:   Administer Labetalol IV as needed for systolic blood pressure greater than 160  or diastolic blood pressure greater than 110 until a maximum cumulative dose of 300 mg.  Initial drug selection to be determined by provider.  1. Initial Dose: Labetalol IV 20 mg.  2. If blood pressure does NOT meet parameters 10 Minutes After Administration: Administer Labetalol IV 40 mg.  3. If Blood Pressure Does NOT Meet Parameters After Another 10 Minutes: Administer Labetalol IV 80 mg.  4. If severe BP persists, physician will determine to continue with labetaol or switch to Hydralazine 5mg.  Give IV Push over 2 minutes.        Or  NIFEdipine (PROCARDIA) capsule 10-20 mg  Dose: 10-20 mg  Freq: Every 20 Minutes PRN Route: PO  PRN Comment: See admin instructions  Start: 02/27/24 1645   Admin Instructions:   Administer NIFEdipine po as needed for Systolic BP > 160 or diastolic BP > 110. Maximum cumulative dose 180mg in 24 hours.  Initial drug selection to be determined by provider.   1. Initial dose: NIFEdipine 10 mg po.  2. If Blood pressure doesn't meet parameters 20 minutes after administration: Administer NIFEdipine 20mg po.  3. If blood pressure doesn't meet parameters 20 minutes after administration: Administer NIFEdipine 20mg po.  4. If severe BP persists, physician will determine labetalol  administration.  Caution: Look alike/sound alike drug alert.  Avoid grapefruit juice.         metFORMIN (GLUCOPHAGE) tablet 500 mg  Dose: 500 mg  Freq: 2 Times Daily With Meals Route: PO  Start: 02/28/24 0800   Admin Instructions:   Caution: Look alike/sound alike drug alert     0813-Given     1800              sodium chloride 0.9 % flush 10 mL  Dose: 10 mL  Freq: As Needed Route: IV  PRN Reason: Line Care  Start: 02/27/24 1642         sodium chloride 0.9 % infusion 40 mL  Dose: 40 mL  Freq: As Needed Route: IV  PRN Reason: Line Care  Start: 02/27/24 1642   Admin Instructions:   Following administration of an IV intermittent medication, flush line with 40mL NS at 100mL/hr.                    Lab Results (all)       Procedure Component Value Units Date/Time    POC Glucose Once [[id number]]  (Normal) Collected: 02/28/24 0812    Specimen: Blood Updated: 02/28/24 0818     Glucose 106 mg/dL     POC Glucose Once [[id number]]  (Normal) Collected: 02/28/24 0634    Specimen: Blood Updated: 02/28/24 0641     Glucose 98 mg/dL     Protein / Creatinine Ratio, Urine - Urine, Clean Catch [[id number]] Collected: 02/27/24 1733    Specimen: Urine, Clean Catch Updated: 02/28/24 0215     Protein/Creatinine Ratio, Urine 172.6 mg/G Crea      Creatinine, Urine 124.6 mg/dL      Total Protein, Urine 21.5 mg/dL     T Pallidum Antibody w/ reflex RPR [[id number]]  (Normal) Collected: 02/27/24 1656    Specimen: Blood Updated: 02/28/24 0210     Treponemal AB Total Non-Reactive    Fentanyl, Urine - Urine, Clean Catch [[id number]]  (Normal) Collected: 02/27/24 1733    Specimen: Urine, Clean Catch Updated: 02/27/24 2218     Fentanyl, Urine Negative    Narrative:      Negative Threshold:      Fentanyl 5 ng/mL     The normal value for the drug tested is negative. This report includes final unconfirmed screening results to be used for medical treatment purposes only. Unconfirmed results must not be used for non-medical purposes such as employment or legal  testing. Clinical consideration should be applied to any drug of abuse test, particularly when unconfirmed results are used.           Urine Drug Screen - Urine, Clean Catch [[id number]]  (Normal) Collected: 02/27/24 1733    Specimen: Urine, Clean Catch Updated: 02/27/24 2215     THC, Screen, Urine Negative     Phencyclidine (PCP), Urine Negative     Cocaine Screen, Urine Negative     Methamphetamine, Ur Negative     Opiate Screen Negative     Amphetamine Screen, Urine Negative     Benzodiazepine Screen, Urine Negative     Tricyclic Antidepressants Screen Negative     Methadone Screen, Urine Negative     Barbiturates Screen, Urine Negative     Oxycodone Screen, Urine Negative     Buprenorphine, Screen, Urine Negative    Narrative:      Cutoff For Drugs Screened:    Amphetamines               500 ng/ml  Barbiturates               200 ng/ml  Benzodiazepines            150 ng/ml  Cocaine                    150 ng/ml  Methadone                  200 ng/ml  Opiates                    100 ng/ml  Phencyclidine               25 ng/ml  THC                         50 ng/ml  Methamphetamine            500 ng/ml  Tricyclic Antidepressants  300 ng/ml  Oxycodone                  100 ng/ml  Buprenorphine               10 ng/ml    The normal value for all drugs tested is negative. This report includes unconfirmed screening results, with the cutoff values listed, to be used for medical treatment purposes only.  Unconfirmed results must not be used for non-medical purposes such as employment or legal testing.  Clinical consideration should be applied to any drug of abuse test, particularly when unconfirmed results are used.      POC Glucose Once [[id number]]  (Normal) Collected: 02/27/24 2052    Specimen: Blood Updated: 02/27/24 2059     Glucose 116 mg/dL     POC Glucose Once [[id number]]  (Normal) Collected: 02/27/24 1802    Specimen: Blood Updated: 02/27/24 1808     Glucose 86 mg/dL     Comprehensive Metabolic Panel [[id number]]   (Abnormal) Collected: 02/27/24 1656    Specimen: Blood Updated: 02/27/24 1729     Glucose 85 mg/dL      BUN 7 mg/dL      Creatinine 0.64 mg/dL      Sodium 135 mmol/L      Potassium 4.4 mmol/L      Chloride 103 mmol/L      CO2 21.4 mmol/L      Calcium 9.1 mg/dL      Total Protein 6.2 g/dL      Albumin 3.3 g/dL      ALT (SGPT) 10 U/L      AST (SGOT) 15 U/L      Alkaline Phosphatase 86 U/L      Total Bilirubin 0.2 mg/dL      Globulin 2.9 gm/dL      A/G Ratio 1.1 g/dL      BUN/Creatinine Ratio 10.9     Anion Gap 10.6 mmol/L      eGFR 120.6 mL/min/1.73     Narrative:      GFR Normal >60  Chronic Kidney Disease <60  Kidney Failure <15      Protime-INR [[id number]]  (Normal) Collected: 02/27/24 1656    Specimen: Blood Updated: 02/27/24 1720     Protime 13.0 Seconds      INR 0.94    Narrative:      Suggested INR therapeutic range for stable oral anticoagulant therapy:    Low Intensity therapy:   1.5-2.0  Moderate Intensity therapy:   2.0-3.0  High Intensity therapy:   2.5-4.0    aPTT [[id number]]  (Abnormal) Collected: 02/27/24 1656    Specimen: Blood Updated: 02/27/24 1720     PTT 26.3 seconds     Narrative:      PTT Heparin Therapeutic Range:  59 - 95 seconds      Fibrinogen [[id number]]  (Abnormal) Collected: 02/27/24 1656    Specimen: Blood Updated: 02/27/24 1720     Fibrinogen 636 mg/dL     CBC & Differential [[id number]]  (Abnormal) Collected: 02/27/24 1656    Specimen: Blood Updated: 02/27/24 1709    Narrative:      The following orders were created for panel order CBC & Differential.  Procedure                               Abnormality         Status                     ---------                               -----------         ------                     CBC Auto Differential[[id number]]        Abnormal            Final result                 Please view results for these tests on the individual orders.    CBC Auto Differential [[id number]]  (Abnormal) Collected: 02/27/24 1656    Specimen: Blood Updated: 02/27/24 1709      WBC 10.82 10*3/mm3      RBC 4.08 10*6/mm3      Hemoglobin 12.1 g/dL      Hematocrit 36.6 %      MCV 89.7 fL      MCH 29.7 pg      MCHC 33.1 g/dL      RDW 15.3 %      RDW-SD 49.2 fl      MPV 10.9 fL      Platelets 184 10*3/mm3      Neutrophil % 76.3 %      Lymphocyte % 14.9 %      Monocyte % 6.6 %      Eosinophil % 1.1 %      Basophil % 0.6 %      Immature Grans % 0.5 %      Neutrophils, Absolute 8.27 10*3/mm3      Lymphocytes, Absolute 1.61 10*3/mm3      Monocytes, Absolute 0.71 10*3/mm3      Eosinophils, Absolute 0.12 10*3/mm3      Basophils, Absolute 0.06 10*3/mm3      Immature Grans, Absolute 0.05 10*3/mm3      nRBC 0.0 /100 WBC     Protein, Urine, 24 Hour - Urine, Clean Catch [[id number]]  (Abnormal) Collected: 02/27/24 1602    Specimen: 24 Hour Urine from Urine, Clean Catch Updated: 02/27/24 1637     Protein, 24H Urine 219.3 mg/24hours      24H Urine Volume 1,700 mL      Time (Hours) 24 hrs           Imaging Results (All)       None          Orders (all)        Start     Ordered    02/28/24 0819  POC Glucose Once  PROCEDURE ONCE        Comments: Complete no more than 45 minutes prior to patient eating      02/28/24 0812    02/28/24 0800  metFORMIN (GLUCOPHAGE) tablet 500 mg  2 Times Daily With Meals         02/27/24 2109    02/28/24 0642  POC Glucose Once  PROCEDURE ONCE        Comments: Complete no more than 45 minutes prior to patient eating      02/28/24 0634    02/27/24 2205  Fentanyl, Urine - Urine, Clean Catch  Once         02/27/24 2204    02/27/24 2100  POC Glucose Once  PROCEDURE ONCE        Comments: Complete no more than 45 minutes prior to patient eating      02/27/24 2052    02/27/24 2000  Vital Signs q 4 while awake  Every 4 Hours      Comments: While the patient is awake.    02/27/24 1645    02/27/24 2000  Vital Signs Per hospital policy  Every 4 Hours      Comments: Daily Weight    02/27/24 1645    02/27/24 2000  Measure Blood Pressure  Every 4 Hours      Comments: Notify MD if > 140/90 x 2.     02/27/24 1645    02/27/24 1900  POC Glucose 4x Daily Fasting & PC  4 Times Daily Fasting & After Meals      Comments: Complete no more than 45 minutes prior to patient eating      02/27/24 1712    02/27/24 1809  POC Glucose Once  PROCEDURE ONCE        Comments: Complete no more than 45 minutes prior to patient eating      02/27/24 1802    02/27/24 1800  Fetal Nonstress Test  3 Times Daily      Comments: Patient presents with:  Non-stress Test: PRESENTS FOR NST AND RETURN 24 HOUR URINE.  PT DENIES ANY UC'S, NO LOF AND NO VAG BLEEDING.  PT HAS +FM.      02/27/24 1727    02/27/24 1726  Protein / Creatinine Ratio, Urine - Urine, Clean Catch  Once         02/27/24 1725    02/27/24 1726  Urine Drug Screen - Urine, Clean Catch  Once         02/27/24 1725    02/27/24 1717  ABO RH Specimen Verification  STAT         02/27/24 1716    02/27/24 1712  Initiate Observation Status  Once         02/27/24 1712    02/27/24 1711  Diet: Regular/House Diet, Cardiac Diets, Diabetic Diets; No Salt Packet; Consistent Carbohydrate; Texture: Regular Texture (IDDSI 7); Fluid Consistency: Thin (IDDSI 0)  Diet Effective Now         02/27/24 1712    02/27/24 1645  hydrALAZINE (APRESOLINE) injection 5-10 mg  Every 20 Minutes PRN        See Hyperspace for full Linked Orders Report.    02/27/24 1645    02/27/24 1645  labetalol (NORMODYNE,TRANDATE) injection 20-80 mg  Every 10 Minutes PRN        See Hyperspace for full Linked Orders Report.    02/27/24 1645    02/27/24 1645  NIFEdipine (PROCARDIA) capsule 10-20 mg  Every 20 Minutes PRN        See Hyperspace for full Linked Orders Report.    02/27/24 1645    02/27/24 1645  CBC & Differential  STAT         02/27/24 1645    02/27/24 1645  Comprehensive Metabolic Panel  STAT         02/27/24 1645    02/27/24 1645  CBC Auto Differential  PROCEDURE ONCE         02/27/24 1645    02/27/24 1644  Type & Screen  STAT         02/27/24 1645    02/27/24 1643  Code Status and Medical Interventions:  Continuous          02/27/24 1645    02/27/24 1643  Obtain Informed Consent  Once         02/27/24 1645    02/27/24 1643  Intermittent Auscultation FOR LOW RISK PATIENTS - NST on Admission Along With Intermittent Auscultation of Fetal Heart Tones.  Per Order Details        Comments: Intermittent Auscultation FOR LOW RISK PATIENTS - NST on Admission Along With Intermittent Auscultation of Fetal Heart Tones.    02/27/24 1645    02/27/24 1643  NST Low risk >24 weeks:  Monitor for 30 minutes BID (Antepartum)  Once        Comments: Fetal monitoring/NST:  Antepartum >24 weeks monitor fetus 30 minutes twice daily    02/27/24 1645    02/27/24 1643  Antepartum Patients  <24 Weeks - Document Fetal Heart Tones Daily and PRN.  Per Order Details        Comments: For Antepartum Patients Less Than 24 Weeks - Document Fetal Heart Tones Daily & PRN.    02/27/24 1645    02/27/24 1643  Notify Physician (specified)  Until Discontinued         02/27/24 1645    02/27/24 1643  Notify physician for hyperstimulus (per hospital algorithm)  Until Discontinued         02/27/24 1645    02/27/24 1643  Notify physician if membranes ruptured, bleeding greater than 1 pad an hour, fetal heart tone abnormality, and severe pain  Until Discontinued         02/27/24 1645    02/27/24 1643  Bed Rest with Bathroom Privileges  Once         02/27/24 1645    02/27/24 1643  NPO Diet NPO Type: Strict NPO  Diet Effective Now,   Status:  Canceled         02/27/24 1645    02/27/24 1643  Advance Diet As Tolerated -  Until Discontinued         02/27/24 1645    02/27/24 1643  Notify physician if membranes ruptured, bleeding greater than 1 pad an hour, fetal heart tone abnormality, and severe pain  Until Discontinued         02/27/24 1645    02/27/24 1643  T Pallidum Antibody w/ reflex RPR  Once         02/27/24 1645    02/27/24 1643  Protime-INR  Once         02/27/24 1645    02/27/24 1643  aPTT  Once         02/27/24 1645    02/27/24 1643  Fibrinogen  Once         02/27/24 1645     02/27/24 1643  Insert Peripheral IV  Once         02/27/24 1645    02/27/24 1643  Saline Lock & Maintain IV Access  Continuous         02/27/24 1645    02/27/24 1643  Place Venous Foot Pump  Once         02/27/24 1645    02/27/24 1643  Maintain Venous Foot Pump  Once         02/27/24 1645    02/27/24 1642  sodium chloride 0.9 % flush 10 mL  As Needed         02/27/24 1645    02/27/24 1642  sodium chloride 0.9 % infusion 40 mL  As Needed         02/27/24 1645    02/27/24 1600  Vital Signs q 4 while awake  Every 4 Hours,   Status:  Canceled      Comments: While the patient is awake.    02/27/24 1559    02/27/24 1600  Protein, Urine, 24 Hour - Urine, Clean Catch  Once         02/27/24 1559    02/27/24 1559  Outpatient In A Bed  Once        Comments: No chief complaint on file.      02/27/24 1559    02/27/24 1559  Vital Signs Per Hospital Policy  Per Hospital Policy,   Status:  Canceled         02/27/24 1559    02/27/24 1559  External Uterine Contraction Monitoring  Per Hospital Policy         02/27/24 1559    02/27/24 1559  Notify Provider (Specified)  Until Discontinued         02/27/24 1559    02/27/24 1559  Notify Provider of Tachysystole (Per Hospital Algorithm)  Until Discontinued         02/27/24 1559    02/27/24 1559  Notify Provider if Membranes Ruptured, Bleeding Greater Than 1 Pad Per Hour, Fetal Heart Tone Abnormality or Severe Pain  Until Discontinued         02/27/24 1559    02/27/24 1559  NPO Diet NPO Type: Ice Chips  Diet Effective Now,   Status:  Canceled         02/27/24 1559    02/27/24 1559  For Antepartum Patients Greater Than 24 Weeks - NST Daily and Monitor Fetal Heart Tones for One Hour 3 Times Daily and PRN.  Per Order Details        Comments: For Antepartum Patients Greater Than 24 Weeks - NST Daily & Monitor Fetal Heart Tones For One Hour 3 Times Daily & PRN.    02/27/24 1559    --  metFORMIN ER (GLUCOPHAGE-XR) 500 MG 24 hr tablet  2 Times Daily         02/27/24 1902    --  glyburide (DIAbeta)  2.5 MG tablet  Daily With Breakfast,   Status:  Discontinued         02/27/24 1900                  Physician Progress Notes (all)    No notes of this type exist for this encounter.       FHR (since admission)       Date/Time Fetal HR Assessment Method Fetal HR (beats/min) Fetal HR Baseline Fetal HR Variability Fetal HR Accelerations Fetal HR Decelerations Fetal HR Tracing Category    02/28/24 0116 external 125 normal range moderate (amplitude range 6 to 25 bpm) lasting at least 15 seconds absent --    02/28/24 0037 external 130 normal range moderate (amplitude range 6 to 25 bpm) lasting at least 15 seconds absent --    02/28/24 0016 external  APPLIED -- -- -- -- -- --    02/27/24 1615 external 135 normal range moderate (amplitude range 6 to 25 bpm) lasting at least 15 seconds absent --          Uterine Activity (since admission)       Date/Time Method Contraction Frequency (Minutes) Contraction Duration (sec) Contraction Intensity Uterine Resting Tone Contraction Pattern    02/28/24 0116 external tocotransducer -- -- no contractions -- --    02/28/24 0037 external tocotransducer;palpation -- -- no contractions soft by palpation --    02/27/24 1615 palpation;per patient report;external tocotransducer -- -- -- soft by palpation --

## 2024-02-28 NOTE — H&P
CHANEL Joya  Obstetric History and Physical    Chief Complaint   Patient presents with    Non-stress Test     PRESENTS FOR NST AND RETURN 24 HOUR URINE.  PT DENIES ANY UC'S, NO LOF AND NO VAG BLEEDING.  PT HAS +FM.       Subjective     Patient is a 33 y.o. female  currently at 36w5d, who presented yesterday from the office for severe range BP in the 180/90s. Denies HA, visual changes, N/V.  24 hour urine 172 this admission.  She has A2DM on glyburide and suspected macrosomia scheduled for primary C/S on Monday.     Her prenatal care is complicated by  hypertension  gestational hypertension, diabetes  GDM A2, and abnormal fetal growth  macrosomia.  Her previous obstetric/gynecological history is noted for is non-contributory.    The following portions of the patients history were reviewed and updated as appropriate: current medications, allergies, past medical history, past surgical history, past family history, past social history, and problem list .       Prenatal Information:   Maternal Prenatal Labs  Blood Type ABO Type   Date Value Ref Range Status   2024 A  Final      Rh Status RH type   Date Value Ref Range Status   2024 Positive  Final      Antibody Screen Antibody Screen   Date Value Ref Range Status   2024 Negative  Final      Rapid Urine Drug Screen Barbiturates Screen, Urine   Date Value Ref Range Status   2024 Negative Negative Final     Benzodiazepine Screen, Urine   Date Value Ref Range Status   2024 Negative Negative Final     Methadone Screen, Urine   Date Value Ref Range Status   2024 Negative Negative Final     Opiate Screen   Date Value Ref Range Status   2024 Negative Negative Final     THC, Screen, Urine   Date Value Ref Range Status   2024 Negative Negative Final     Cocaine Screen, Urine   Date Value Ref Range Status   2024 Negative Negative Final     Amphetamine Screen, Urine   Date Value Ref Range Status   2024 Negative  "Negative Final     Buprenorphine, Screen, Urine   Date Value Ref Range Status   02/27/2024 Negative Negative Final     Methamphetamine, Ur   Date Value Ref Range Status   02/27/2024 Negative Negative Final     Oxycodone Screen, Urine   Date Value Ref Range Status   02/27/2024 Negative Negative Final     Tricyclic Antidepressants Screen   Date Value Ref Range Status   02/27/2024 Negative Negative Final      Group B Strep Culture No results found for: \"GBSANTIGEN\"           External Prenatal Results       Pregnancy Outside Results - Transcribed From Office Records - See Scanned Records For Details       Test Value Date Time    ABO  A  02/27/24 1656    Rh  Positive  02/27/24 1656    Antibody Screen  Negative  02/27/24 1656      ^ Negative  08/01/23     Varicella IgG       Rubella ^ Immune  08/01/23     Hgb  12.1 g/dL 02/27/24 1656       12.7 g/dL 02/26/24 1614       12.4 g/dL 02/15/24 0953    Hct  36.6 % 02/27/24 1656       37.5 % 02/26/24 1614       37.6 % 02/15/24 0953    Glucose Fasting GTT       Glucose Tolerance Test 1 hour       Glucose Tolerance Test 3 hour       Gonorrhea (discrete)       Chlamydia (discrete)       RPR ^ Non-Reactive  08/01/23     VDRL       Syphilis Antibody       HBsAg ^ Negative  08/01/23     Herpes Simplex Virus PCR       Herpes Simplex VIrus Culture       HIV ^ Non-Reactive  08/01/23     Hep C RNA Quant PCR       Hep C Antibody       AFP       Group B Strep ^ Positive  02/21/24     GBS Susceptibility to Clindamycin       GBS Susceptibility to Erythromycin       Fetal Fibronectin       Genetic Testing, Maternal Blood                 Drug Screening       Test Value Date Time    Urine Drug Screen       Amphetamine Screen  Negative  02/27/24 1733    Barbiturate Screen  Negative  02/27/24 1733    Benzodiazepine Screen  Negative  02/27/24 1733    Methadone Screen  Negative  02/27/24 1733    Phencyclidine Screen  Negative  02/27/24 1733    Opiates Screen  Negative  02/27/24 1733    THC Screen  " Negative  24 1733    Cocaine Screen       Propoxyphene Screen       Buprenorphine Screen  Negative  24 1733    Methamphetamine Screen       Oxycodone Screen  Negative  24 1733    Tricyclic Antidepressants Screen  Negative  24 1733              Legend    ^: Historical                              Past OB History:     OB History    Para Term  AB Living   2 0 0 0 1 0   SAB IAB Ectopic Molar Multiple Live Births   1 0 0 0 0 0      # Outcome Date GA Lbr Lul/2nd Weight Sex Delivery Anes PTL Lv   2 Current            1 SAB 2022 6w4d              Past Medical History: Past Medical History:   Diagnosis Date    Anxiety     Gestational diabetes     HTN (hypertension)       Past Surgical History Past Surgical History:   Procedure Laterality Date    TONSILLECTOMY        Family History: Family History   Problem Relation Age of Onset    Hypertension Mother     Hypertension Father       Social History:  reports that she has never smoked. She has never used smokeless tobacco.   reports no history of alcohol use.   reports no history of drug use.        Review of Systems                  Review of Systems   Pertinent items are noted in HPI, all other systems reviewed and negative      Objective     Vital Signs Range for the last 24 hours  Temperature: Temp:  [98 °F (36.7 °C)-98.2 °F (36.8 °C)] 98.2 °F (36.8 °C)   Temp Source: Temp src: Oral   BP: BP: (0-184)/(0-95) 149/87   Pulse: Heart Rate:  [] 88   Respirations: Resp:  [18] 18   Weight: Weight:  [131 kg (289 lb 3.2 oz)] 131 kg (289 lb 3.2 oz)     Physical Examination: General appearance - alert, well appearing, and in no distress  Chest - clear to auscultation, no wheezes, rales or rhonchi, symmetric air entry  Heart - normal rate, regular rhythm, normal S1, S2, no murmurs, rubs, clicks or gallops  Abdomen - soft, nontender, nondistended, no masses or organomegaly  Extremities - peripheral pulses normal, no pedal edema, no  clubbing or cyanosis          Assessment & Plan       Hypertension affecting pregnancy      Assessment & Plan    Assessment/Plan:  1.  Intrauterine pregnancy at 36w5d weeks gestation with reactive fetal status.    2.  Severe GHTN- stable at present.  Discussed with PDC/MFM and given BP after 34 weeks they have recommended delivery.  Pt ate at 0800 today and discussed with anesthesia and will delivery 4pm.   3. A2DM- on glyburide.  Will check BS now.    4. Macrosomia- Pt counseled previously for primary c/s due to macrosomia and CPD and I agree.  Risk and benefit reviewed with pt and she admits to understanding and agrees to proceed.        Angelique Menendez DO  2/28/2024  10:50 EST

## 2024-02-28 NOTE — ANESTHESIA PREPROCEDURE EVALUATION
Anesthesia Evaluation     no history of anesthetic complications:   NPO Solid Status: > 8 hours  NPO Liquid Status: > 8 hours           Airway   Mallampati: II  TM distance: >3 FB  Neck ROM: full  No difficulty expected  Dental - normal exam     Pulmonary - normal exam   Cardiovascular - normal exam    (+) hypertension      Neuro/Psych  GI/Hepatic/Renal/Endo    (+) diabetes mellitus gestational    Musculoskeletal     Abdominal  - normal exam   Substance History      OB/GYN    (+) Pregnant        Other                    Anesthesia Plan    ASA 3     spinal and ITN     intravenous induction     Anesthetic plan, risks, benefits, and alternatives have been provided, discussed and informed consent has been obtained with: patient.    CODE STATUS:    Level Of Support Discussed With: Patient  Code Status (Patient has no pulse and is not breathing): CPR (Attempt to Resuscitate)  Medical Interventions (Patient has pulse or is breathing): Full Support

## 2024-02-28 NOTE — OP NOTE
Primary Low Transverse  Section Operation Note    Joanna Hernandez  2024  36w5d     Pre-op Diagnosis:   Severe GHTN  A2DM  Macrosomia    Post-op Diagnosis:     Post-Op Diagnosis Codes:  RENA    Procedure(s):   SECTION PRIMARY     Surgeon(s):  Angelique Menendez DO    Anesthesia: Spinal    Staff:   Circulator: Moraima Hermosillo, RN  Baby Nurse: Kelly Bonilla RN; Belen Graves RN  Assistant: Nora Layton  OB TECH: Veronica Guerrero    Estimated Blood Loss:  850 cc  I/O this shift:  In: 700 [I.V.:600; IV Piggyback:100]  Out: 650 [Urine:50; Blood:600]    Time:     Grafts/Implants: NA    Procedure     The patient was placed in the dorsal supine position.  She was then prepped and draped in the normal sterile fashion.  Spinal anesthesia was found to be adequate.  A Pfannenstiel skin incision was made with the scalpel and carried down to the underlying layer of fascia.  The fascia was then incised and extended bilaterally with the Aragon scissors.  The superior aspect of this incision was grasped with Kocher clamps x 2, tented upward and dissected off with Aragon scissors.  The clamps were removed and placed on the inferior aspect of the incision, tented upward and dissected off the rectus muscle to the pubic symphysis.  The rectus muscle was then  in the midline.  The peritoneum was identified, grasped with hemostats x 2 and entered sharply with Metzenbaum scissors.  This incision was then extended superiorly and inferiorly.  The bladder blade was then inserted.  The vesicouterine peritoneum was grasped and entered sharply with Metzenbaum scissors and extended bilaterally.  The bladder blade was re-inserted.  A transverse uterine incision was then made with the scalpel and extended in a cranio-caudal  Blunt fashion.  Bladder blade was removed. The infants head was then grasped and brought through the hysterotomy.  The mouth and nares was bulb suctioned.  The shoulders and body were then  delivered atraumatically.  The cord was then doubly clamped and cut and the infant was handed off to the nursing staff with vigorous cry and movement of all extremities.  Cord blood was obtained.  The placenta was manually extracted.  The uterus was exteriorized and cleared of all clot and debris.  The hysterotomy was grasped with Allis clamps at the edge and repaired with 1-0 Monocryl in a running and locked fashion.  A second layer of the same suture was made.  The posterior cul-de-sac was irrigated and suctioned. The uterine incision  was hemostatic and the uterus was returned to the abdomen.  The gutters were cleared of all clot and debris.  Second look at the uterine incision was hemostatic.  The rectus muscle was visualized and hemostatic.  Fascia was then grasped with Kocher clamps and re-approximated with 0  double loop PDS in a single layer and running fashion.  The subcutaneous layer was irrigated and hemostatic with Bovie cautery.  This layer was then re-approximated with 2-0 plain gut stitch.  The skin was re-approximated with 3-0 Vicryl on a Bret needle.  Sterile dressing was applied to the incision.  Uterus was found to be firm and at the umbilicus.  Sponge, lap and needle counts were correct.  Patient was taken to recovery in stable condition.     APGARS  8 @ 1 minute and 9 @ 5 minute    GENDER  BOY    Complications: None    Angelique Menendez DO     Date: 2/28/2024  Time: 17:10 EST

## 2024-02-29 LAB
BASOPHILS # BLD AUTO: 0.03 10*3/MM3 (ref 0–0.2)
BASOPHILS NFR BLD AUTO: 0.2 % (ref 0–1.5)
DEPRECATED RDW RBC AUTO: 47.7 FL (ref 37–54)
EOSINOPHIL # BLD AUTO: 0 10*3/MM3 (ref 0–0.4)
EOSINOPHIL NFR BLD AUTO: 0 % (ref 0.3–6.2)
ERYTHROCYTE [DISTWIDTH] IN BLOOD BY AUTOMATED COUNT: 14.9 % (ref 12.3–15.4)
HCT VFR BLD AUTO: 31.7 % (ref 34–46.6)
HGB BLD-MCNC: 10.5 G/DL (ref 12–15.9)
IMM GRANULOCYTES # BLD AUTO: 0.1 10*3/MM3 (ref 0–0.05)
IMM GRANULOCYTES NFR BLD AUTO: 0.6 % (ref 0–0.5)
LYMPHOCYTES # BLD AUTO: 0.93 10*3/MM3 (ref 0.7–3.1)
LYMPHOCYTES NFR BLD AUTO: 5.2 % (ref 19.6–45.3)
MCH RBC QN AUTO: 29.2 PG (ref 26.6–33)
MCHC RBC AUTO-ENTMCNC: 33.1 G/DL (ref 31.5–35.7)
MCV RBC AUTO: 88.1 FL (ref 79–97)
MONOCYTES # BLD AUTO: 0.77 10*3/MM3 (ref 0.1–0.9)
MONOCYTES NFR BLD AUTO: 4.3 % (ref 5–12)
NEUTROPHILS NFR BLD AUTO: 16.09 10*3/MM3 (ref 1.7–7)
NEUTROPHILS NFR BLD AUTO: 89.7 % (ref 42.7–76)
NRBC BLD AUTO-RTO: 0 /100 WBC (ref 0–0.2)
PLATELET # BLD AUTO: 198 10*3/MM3 (ref 140–450)
PMV BLD AUTO: 10.3 FL (ref 6–12)
RBC # BLD AUTO: 3.6 10*6/MM3 (ref 3.77–5.28)
WBC NRBC COR # BLD AUTO: 17.92 10*3/MM3 (ref 3.4–10.8)

## 2024-02-29 PROCEDURE — 25010000002 ENOXAPARIN PER 10 MG: Performed by: OBSTETRICS & GYNECOLOGY

## 2024-02-29 PROCEDURE — 25010000002 KETOROLAC TROMETHAMINE PER 15 MG: Performed by: OBSTETRICS & GYNECOLOGY

## 2024-02-29 PROCEDURE — 85025 COMPLETE CBC W/AUTO DIFF WBC: CPT | Performed by: OBSTETRICS & GYNECOLOGY

## 2024-02-29 RX ADMIN — KETOROLAC TROMETHAMINE 15 MG: 30 INJECTION, SOLUTION INTRAMUSCULAR at 02:46

## 2024-02-29 RX ADMIN — ACETAMINOPHEN 1000 MG: 500 TABLET ORAL at 06:10

## 2024-02-29 RX ADMIN — ACETAMINOPHEN 1000 MG: 500 TABLET ORAL at 17:36

## 2024-02-29 RX ADMIN — ACETAMINOPHEN 1000 MG: 500 TABLET ORAL at 12:25

## 2024-02-29 RX ADMIN — IBUPROFEN 600 MG: 600 TABLET, FILM COATED ORAL at 21:12

## 2024-02-29 RX ADMIN — KETOROLAC TROMETHAMINE 15 MG: 30 INJECTION, SOLUTION INTRAMUSCULAR at 15:27

## 2024-02-29 RX ADMIN — POLYETHYLENE GLYCOL (3350) 17 G: 17 POWDER, FOR SOLUTION ORAL at 09:04

## 2024-02-29 RX ADMIN — KETOROLAC TROMETHAMINE 15 MG: 30 INJECTION, SOLUTION INTRAMUSCULAR at 09:04

## 2024-02-29 RX ADMIN — ENOXAPARIN SODIUM 40 MG: 40 INJECTION SUBCUTANEOUS at 17:36

## 2024-02-29 NOTE — ANESTHESIA POSTPROCEDURE EVALUATION
Patient: Joanna Hernandez    Procedure Summary       Date: 24 Room / Location:  [facility] /  [facility]    Anesthesia Start: 155 Anesthesia Stop:     Procedure:  SECTION PRIMARY (Bilateral: Abdomen) Diagnosis:       Hypertension affecting pregnancy in third trimester      (Hypertension affecting pregnancy in third trimester [O16.3])    Surgeons: Angelique Menendez DO Provider: Cleveland Luong MD    Anesthesia Type: spinal, ITN ASA Status: 3            Anesthesia Type: spinal, ITN    Vitals  Vitals Value Taken Time   /72 24 0828   Temp 98.8 °F (37.1 °C) 24 08   Pulse 87 24 0828   Resp 18 24 0828   SpO2 99 % 24           Post Anesthesia Care and Evaluation    Patient location during evaluation: bedside  Patient participation: complete - patient participated  Level of consciousness: awake and alert  Pain score: 1  Pain management: adequate    Airway patency: patent  Anesthetic complications: No anesthetic complications  PONV Status: none  Cardiovascular status: acceptable  Respiratory status: acceptable  Hydration status: acceptable

## 2024-02-29 NOTE — NURSING NOTE
Pt voices no complaints at this time. Pericare performed, new gown placed. Pt transported to room 246.

## 2024-02-29 NOTE — PROGRESS NOTES
Spontaneous Vaginal Delivery Progress Note      Hospital Course: G 2, now P 0111 Patient was admitted on 2024  3:52 PM with IUP at 36w5d weeks gestation secondary to Hypertension affecting pregnancy [O16.9]  Postpartum care following  delivery [Z39.2]. Patient underwent a normal spontaneous vaginal delivery.       Patient stable. No complaints.     signs in last 24 hours:    Vital Signs Range for the last 24 hours              Temp:  [98.1 °F (36.7 °C)-98.9 °F (37.2 °C)] 98.8 °F (37.1 °C)  Heart Rate:  [] 87  Resp:  [18] 18  BP: ()/() 117/72     Radiology     Imaging Results (Last 24 Hours)       ** No results found for the last 24 hours. **             Labs     Lab Results (last 24 hours)       Procedure Component Value Units Date/Time    CBC & Differential [[id number]]  (Abnormal) Collected: 24    Specimen: Blood Updated: 24    Narrative:      The following orders were created for panel order CBC & Differential.  Procedure                               Abnormality         Status                     ---------                               -----------         ------                     CBC Auto Differential[[id number]]        Abnormal            Final result                 Please view results for these tests on the individual orders.    CBC Auto Differential [[id number]]  (Abnormal) Collected: 24    Specimen: Blood Updated: 24     WBC 17.92 10*3/mm3      RBC 3.60 10*6/mm3      Hemoglobin 10.5 g/dL      Hematocrit 31.7 %      MCV 88.1 fL      MCH 29.2 pg      MCHC 33.1 g/dL      RDW 14.9 %      RDW-SD 47.7 fl      MPV 10.3 fL      Platelets 198 10*3/mm3      Neutrophil % 89.7 %      Lymphocyte % 5.2 %      Monocyte % 4.3 %      Eosinophil % 0.0 %      Basophil % 0.2 %      Immature Grans % 0.6 %      Neutrophils, Absolute 16.09 10*3/mm3      Lymphocytes, Absolute 0.93 10*3/mm3      Monocytes, Absolute 0.77 10*3/mm3      Eosinophils, Absolute  0.00 10*3/mm3      Basophils, Absolute 0.03 10*3/mm3      Immature Grans, Absolute 0.10 10*3/mm3      nRBC 0.0 /100 WBC     POC Glucose Once [[id number]]  (Normal) Collected: 24 1104    Specimen: Blood Updated: 24 1120     Glucose 94 mg/dL               Review of Systems    The following systems were reviewed and negative;  ENT, respiratory, cardiovascular, gastrointestinal, genitourinary, breast, endocrine and allergies / immunologic.      Physical Exam:      General Appearance:    Alert, cooperative, in no acute distress   Head:    Normocephalic, without obvious abnormality, atraumatic   Eyes:            Lids and lashes normal, conjunctivae and sclerae normal, no   icterus, no pallor, corneas clear, PERRLA   Ears:    Ears appear intact with no abnormalities noted   Throat:   No oral lesions, no thrush, oral mucosa moist   Neck:   No adenopathy, supple, trachea midline, no thyromegaly, no     carotid bruit, no JVD   Back:     No kyphosis present, no scoliosis present, no skin lesions,       erythema or scars, no tenderness to percussion or                   palpation,   range of motion normal   Lungs:     Clear to auscultation,respirations regular, even and                   unlabored    Heart:    Regular rhythm and normal rate, normal S1 and S2, no            murmur, no gallop, no rub, no click   Breast Exam:    Deferred   Abdomen:     Normal bowel sounds, no masses, no organomegaly, soft        non-tender, non-distended, no guarding, no rebound                 tenderness   Genitalia:    Deferred   Extremities:   Moves all extremities well, no edema, no cyanosis, no              redness   Pulses:   Pulses palpable and equal bilaterally   Skin:   No bleeding, bruising or rash   Lymph nodes:   No palpable adenopathy   Neurologic:   Cranial nerves 2 - 12 grossly intact, sensation intact, DTR        present and equal bilaterally                 Assessment:  1.  . PPD#1. Patient doing well. No complaints.      Plan:  1. Will continue current plan of care and anticipate discharge to home in the AM.      Todd Barrera III, MD  02/29/24  09:50 EST

## 2024-02-29 NOTE — PLAN OF CARE
Problem: Adult Inpatient Plan of Care  Goal: Plan of Care Review  Outcome: Ongoing, Progressing  Flowsheets (Taken 2/29/2024 0313)  Progress: improving  Plan of Care Reviewed With:   patient   spouse  Outcome Evaluation: pt's bleeding and vitals wnl, fundus firm,  Goal: Patient-Specific Goal (Individualized)  Outcome: Ongoing, Progressing  Goal: Absence of Hospital-Acquired Illness or Injury  Outcome: Ongoing, Progressing  Intervention: Identify and Manage Fall Risk  Description: Perform standard risk assessment on admission using a validated tool or comprehensive approach appropriate to the patient; reassess fall risk frequently, with change in status or transfer to another level of care.  Communicate fall injury risk to interprofessional healthcare team.  Determine need for increased observation, equipment and environmental modification, such as low bed, signage and supportive, nonskid footwear.  Adjust safety measures to individual developmental age, stage and identified risk factors.  Reinforce the importance of safety and physical activity with patient and family.  Perform regular intentional rounding to assess need for position change, pain assessment and personal needs, including assistance with toileting.  Flowsheets (Taken 2/28/2024 2000)  Safety Promotion/Fall Prevention:   safety round/check completed   nonskid shoes/slippers when out of bed  Intervention: Prevent and Manage VTE (Venous Thromboembolism) Risk  Description: Assess for VTE (venous thromboembolism) risk.  Encourage and assist with early ambulation.  Initiate and maintain compression or other therapy, as indicated, based on identified risk in accordance with organizational protocol and provider order.  Encourage both active and passive leg exercises while in bed, if unable to ambulate.  Flowsheets  Taken 2/28/2024 2000 by Latonia Granado RN  Activity Management: bedrest  Taken 2/28/2024 1725 by Moraima Hermosillo, LATRICIA  VTE  Prevention/Management: sequential compression devices on  Goal: Optimal Comfort and Wellbeing  Outcome: Ongoing, Progressing  Intervention: Provide Person-Centered Care  Description: Use a family-focused approach to care.  Develop trust and rapport by proactively providing information, encouraging questions, addressing concerns and offering reassurance.  Acknowledge emotional response to hospitalization.  Recognize and utilize personal coping strategies.  Honor spiritual and cultural preferences.  Flowsheets (Taken 2/28/2024 2000)  Trust Relationship/Rapport: care explained  Goal: Readiness for Transition of Care  Outcome: Ongoing, Progressing  Intervention: Mutually Develop Transition Plan  Description: Identify available resources for support (e.g., family, friends, community).  Identify and address barriers to ongoing treatment and home management (e.g., environmental, financial).  Provide opportunities to practice self-management skills.  Assess and monitor emotional readiness for transition.  Establish or reconnect linkage with outpatient providers or community-based services.  Flowsheets (Taken 2/29/2024 0313)  Equipment Currently Used at Home: none  Transportation Anticipated: family or friend will provide  Concerns to be Addressed: no discharge needs identified  Readmission Within the Last 30 Days: no previous admission in last 30 days  Patient/Family Anticipated Services at Transition: none     Problem: Hypertensive Disorders in Pregnancy  Goal: Maternal-Fetal Stabilization  Outcome: Ongoing, Progressing  Intervention: Monitor and Manage Symptom Progression  Description: Note behavioral changes (e.g., restlessness).  Monitor for report of headache not relieved by pharmacologic therapy or visual disturbance.  Implement seizure precautions.  Evaluate any complaint of epigastric or abdominal pain.  Anticipate initiation and titration of magnesium sulfate infusion for seizure prevention. Note: Magnesium sulfate  has also been identified to provide neuroprotection with gestations of less than 32 weeks.  Assess for signs of bleeding (vaginal or other sites, such as intravenous site, gums).  Evaluate for presence of respiratory compromise by regularly auscultating breath sounds and monitoring pulse oximetry (continuous if on magnesium sulfate); report presence of chest pain, decreased oxygen saturation, cough and shortness of breath.  Provide calm, reassuring presence; offer clear explanation of events.  Prepare for delivery, planned or emergent, based on change in maternal-fetal status.  Flowsheets (Taken 2024 0313)  Medication Review/Management: medications reviewed     Problem: Diabetes in Pregnancy  Goal: Blood Glucose Level Within Targeted Range  Outcome: Ongoing, Progressing     Problem: Adjustment to Role Transition (Postpartum  Delivery)  Goal: Successful Maternal Role Transition  Outcome: Ongoing, Progressing     Problem: Bleeding (Postpartum  Delivery)  Goal: Hemostasis  Outcome: Ongoing, Progressing     Problem: Infection (Postpartum  Delivery)  Goal: Absence of Infection Signs and Symptoms  Outcome: Ongoing, Progressing     Problem: Pain (Postpartum  Delivery)  Goal: Acceptable Pain Control  Outcome: Ongoing, Progressing     Problem: Postoperative Nausea and Vomiting (Postpartum  Delivery)  Goal: Nausea and Vomiting Relief  Outcome: Ongoing, Progressing     Problem: Postoperative Urinary Retention (Postpartum  Delivery)  Goal: Effective Urinary Elimination  Outcome: Ongoing, Progressing  Intervention: Monitor and Manage Urinary Retention  Description: Monitor fluid balance to promote optimal hydration.  Assess for bladder distension; encourage voiding within 6 hours following removal of indwelling catheter and regularly thereafter.  Promote behavioral methods to enhance voiding ability (e.g., utilize relaxation techniques, mutually establish a regular elimination  schedule, allow time for bladder emptying, position to optimize flow; double voiding).  Implement methods to facilitate elimination (e.g., running water, warm water over perineum, sitz bath, privacy).  Utilize portable bladder ultrasound to assess pre- and postvoid volumes.  Anticipate the need for intermittent catheterization; advocate for early removal of indwelling device.  Facilitate urogenital hygiene to maintain perineal skin integrity.  Promote early mobilization to improve return of urinary tract function.  Flowsheets (Taken 2/29/2024 0313)  Urinary Elimination Promotion: catheter patency maintained   Goal Outcome Evaluation:

## 2024-02-29 NOTE — PAYOR COMM NOTE
"CONTACT:  ELI JONES MSN, RN  UTILIZATION MANAGEMENT DEPT.  [facility]  [address] [facility], [address]  PHONE:  [phone]  FAX: [phone]    CLINICAL UPDATE. PATIENT CONVERTED TO INPATIENT STATUS ON 24.    REFERENCE # [id number]       Gurinder Cortes (33 y.o. Female)       Date of Birth   1990    Social Security Number       Address   [address] [address]    Home Phone   [phone]    MRN   [id number]       Confucianist   None    Marital Status                               Admission Date   24    Admission Type   Elective    Admitting Provider   Aden Hull DO    Attending Provider   Aden Hull DO    Department, Room/Bed   [facility], W246/       Discharge Date       Discharge Disposition       Discharge Destination                                 Attending Provider: Aden Hull DO    Allergies: Penicillins    Isolation: None   Infection: None   Code Status: CPR    Ht: 162.6 cm (64\")   Wt: 131 kg (289 lb 3.2 oz)    Admission Cmt: None   Principal Problem: Hypertension affecting pregnancy [O16.9]                   Active Insurance as of 2024       Primary Coverage       Payor Plan Insurance Group Employer/Plan Group    [employer] BLUE [employer]OSS [employer] BLUE CROSS BLUE SHIELD PPO [id number]       Payor Plan Address Payor Plan Phone Number Payor Plan Fax Number Effective Dates    [address] [phone]  2022 - None Entered    [place] [address]         Subscriber Name Subscriber Birth Date Member ID       GURINDER CORTES 1990 [id number]                     Emergency Contacts        (Rel.) Home Phone Work Phone Mobile Phone    JUAREZ CORTES (Spouse) [phone] -- [phone]          DELIVERY INFORMATION:    DELIVERY DATE AND TIME: 24 @ 1630    DELIVERY TYPE: PRIMARY     GENDER OF BABY: MALE    WEIGHT:  3715 GRAMS    APGARS:  8/9    NURSERY TYPE: WELL " BABY    GESTATIONAL AGE: 36/5    EDC: 3/22/24    /PARA: 2/1       History & Physical        Angelique Menendez DO at 24 1050          [facility]  Obstetric History and Physical    Chief Complaint   Patient presents with    Non-stress Test     PRESENTS FOR NST AND RETURN 24 HOUR URINE.  PT DENIES ANY UC'S, NO LOF AND NO VAG BLEEDING.  PT HAS +FM.       Subjective    Patient is a 33 y.o. female  currently at 36w5d, who presented yesterday from the office for severe range BP in the 180/90s. Denies HA, visual changes, N/V.  24 hour urine 172 this admission.  She has A2DM on glyburide and suspected macrosomia scheduled for primary C/S on Monday.     Her prenatal care is complicated by  hypertension  gestational hypertension, diabetes  GDM A2, and abnormal fetal growth  macrosomia.  Her previous obstetric/gynecological history is noted for is non-contributory.    The following portions of the patients history were reviewed and updated as appropriate: current medications, allergies, past medical history, past surgical history, past family history, past social history, and problem list .       Prenatal Information:   Maternal Prenatal Labs  Blood Type ABO Type   Date Value Ref Range Status   2024 A  Final      Rh Status RH type   Date Value Ref Range Status   2024 Positive  Final      Antibody Screen Antibody Screen   Date Value Ref Range Status   2024 Negative  Final      Rapid Urine Drug Screen Barbiturates Screen, Urine   Date Value Ref Range Status   2024 Negative Negative Final     Benzodiazepine Screen, Urine   Date Value Ref Range Status   2024 Negative Negative Final     Methadone Screen, Urine   Date Value Ref Range Status   2024 Negative Negative Final     Opiate Screen   Date Value Ref Range Status   2024 Negative Negative Final     THC, Screen, Urine   Date Value Ref Range Status   2024 Negative Negative Final     Cocaine Screen, Urine   Date  "Value Ref Range Status   02/27/2024 Negative Negative Final     Amphetamine Screen, Urine   Date Value Ref Range Status   02/27/2024 Negative Negative Final     Buprenorphine, Screen, Urine   Date Value Ref Range Status   02/27/2024 Negative Negative Final     Methamphetamine, Ur   Date Value Ref Range Status   02/27/2024 Negative Negative Final     Oxycodone Screen, Urine   Date Value Ref Range Status   02/27/2024 Negative Negative Final     Tricyclic Antidepressants Screen   Date Value Ref Range Status   02/27/2024 Negative Negative Final      Group B Strep Culture No results found for: \"GBSANTIGEN\"           External Prenatal Results       Pregnancy Outside Results - Transcribed From Office Records - See Scanned Records For Details       Test Value Date Time    ABO  A  02/27/24 1656    Rh  Positive  02/27/24 1656    Antibody Screen  Negative  02/27/24 1656      ^ Negative  08/01/23     Varicella IgG       Rubella ^ Immune  08/01/23     Hgb  12.1 g/dL 02/27/24 1656       12.7 g/dL 02/26/24 1614       12.4 g/dL 02/15/24 0953    Hct  36.6 % 02/27/24 1656       37.5 % 02/26/24 1614       37.6 % 02/15/24 0953    Glucose Fasting GTT       Glucose Tolerance Test 1 hour       Glucose Tolerance Test 3 hour       Gonorrhea (discrete)       Chlamydia (discrete)       RPR ^ Non-Reactive  08/01/23     VDRL       Syphilis Antibody       HBsAg ^ Negative  08/01/23     Herpes Simplex Virus PCR       Herpes Simplex VIrus Culture       HIV ^ Non-Reactive  08/01/23     Hep C RNA Quant PCR       Hep C Antibody       AFP       Group B Strep ^ Positive  02/21/24     GBS Susceptibility to Clindamycin       GBS Susceptibility to Erythromycin       Fetal Fibronectin       Genetic Testing, Maternal Blood                 Drug Screening       Test Value Date Time    Urine Drug Screen       Amphetamine Screen  Negative  02/27/24 1733    Barbiturate Screen  Negative  02/27/24 1733    Benzodiazepine Screen  Negative  02/27/24 1733    Methadone " Screen  Negative  24 1733    Phencyclidine Screen  Negative  24 1733    Opiates Screen  Negative  24 1733    THC Screen  Negative  24 1733    Cocaine Screen       Propoxyphene Screen       Buprenorphine Screen  Negative  24 1733    Methamphetamine Screen       Oxycodone Screen  Negative  24 1733    Tricyclic Antidepressants Screen  Negative  24 1733              Legend    ^: Historical                              Past OB History:     OB History    Para Term  AB Living   2 0 0 0 1 0   SAB IAB Ectopic Molar Multiple Live Births   1 0 0 0 0 0      # Outcome Date GA Lbr Lul/2nd Weight Sex Delivery Anes PTL Lv   2 Current            1 SAB 2022 6w4d              Past Medical History: Past Medical History:   Diagnosis Date    Anxiety 2021    Gestational diabetes     HTN (hypertension)       Past Surgical History Past Surgical History:   Procedure Laterality Date    TONSILLECTOMY        Family History: Family History   Problem Relation Age of Onset    Hypertension Mother     Hypertension Father       Social History:  reports that she has never smoked. She has never used smokeless tobacco.   reports no history of alcohol use.   reports no history of drug use.        Review of Systems                  Review of Systems   Pertinent items are noted in HPI, all other systems reviewed and negative      Objective    Vital Signs Range for the last 24 hours  Temperature: Temp:  [98 °F (36.7 °C)-98.2 °F (36.8 °C)] 98.2 °F (36.8 °C)   Temp Source: Temp src: Oral   BP: BP: (0-184)/(0-95) 149/87   Pulse: Heart Rate:  [] 88   Respirations: Resp:  [18] 18   Weight: Weight:  [131 kg (289 lb 3.2 oz)] 131 kg (289 lb 3.2 oz)     Physical Examination: General appearance - alert, well appearing, and in no distress  Chest - clear to auscultation, no wheezes, rales or rhonchi, symmetric air entry  Heart - normal rate, regular rhythm, normal S1, S2, no murmurs, rubs, clicks or  gallops  Abdomen - soft, nontender, nondistended, no masses or organomegaly  Extremities - peripheral pulses normal, no pedal edema, no clubbing or cyanosis          Assessment & Plan      Hypertension affecting pregnancy      Assessment & Plan    Assessment/Plan:  1.  Intrauterine pregnancy at 36w5d weeks gestation with reactive fetal status.    2.  Severe GHTN- stable at present.  Discussed with PDC/MFM and given BP after 34 weeks they have recommended delivery.  Pt ate at 0800 today and discussed with anesthesia and will delivery 4pm.   3. A2DM- on glyburide.  Will check BS now.    4. Macrosomia- Pt counseled previously for primary c/s due to macrosomia and CPD and I agree.  Risk and benefit reviewed with pt and she admits to understanding and agrees to proceed.        Angelique Menendez DO  2/28/2024  10:50 EST    Electronically signed by Angelique Menendez DO at 02/28/24 1103       H&P signed by [employer], [place] at 02/28/24 0942         [Media Unavailable] Scan on 2/28/2024 0803 by [employer], [place]: H&P, [facility], 02/27/2024          Electronically signed by [employer], [place] at 02/28/24 0942       Orders (all)        Start     Ordered    03/01/24 0000  acetaminophen (TYLENOL) tablet 650 mg  Every 6 Hours        See Hyperspace for full Linked Orders Report.    02/28/24 2029 02/29/24 2100  ibuprofen (ADVIL,MOTRIN) tablet 600 mg  Every 6 Hours        See Hyperspace for full Linked Orders Report.    02/28/24 2029 02/29/24 1715  Enoxaparin Sodium (LOVENOX) syringe 40 mg  Every 12 Hours         02/28/24 2029 02/29/24 0900  polyethylene glycol (MIRALAX) packet 17 g  Daily         02/28/24 2029 02/29/24 0800  Ambulate Patient  2 Times Daily      Comments: After anesthesia wears off.    02/28/24 2029 02/29/24 0600  Discontinue Indwelling Urinary Catheter in AM  Once         02/28/24 2029 02/29/24 0600  CBC & Differential  Timed         02/28/24 2029 02/29/24 0600  CBC Auto  Differential  PROCEDURE ONCE         02/28/24 2202 02/29/24 0000  Discontinue IV  Once         02/28/24 2029 02/29/24 0000  Remove Abdominal Dressing  Once         02/28/24 2029 02/29/24 0000  acetaminophen (TYLENOL) tablet 1,000 mg  Every 6 Hours        See Hyperspace for full Linked Orders Report.    02/28/24 2029 02/28/24 2200  Incentive spirometry RT  Every 2 Hours While Awake       02/28/24 2029 02/28/24 2115  metoclopramide (REGLAN) tablet 10 mg  Once         02/28/24 2029 02/28/24 2100  ketorolac (TORADOL) injection 15 mg  Every 6 Hours        See Hyperspace for full Linked Orders Report.    02/28/24 2029 02/28/24 2052  Urinary Catheter Care  Every Shift,   Status:  Canceled       02/28/24 2051 02/28/24 2030  Notify Provider (Specified)  Until Discontinued         02/28/24 2029 02/28/24 2030  Vital Signs Per Hospital Policy  Per Hospital Policy         02/28/24 2029 02/28/24 2030  Strict Bed Rest  Until Discontinued         02/28/24 2029 02/28/24 2030  Fundal & Lochia Check  Every Shift       02/28/24 2029 02/28/24 2030  Diet: Regular/House Diet; Texture: Regular Texture (IDDSI 7); Fluid Consistency: Thin (IDDSI 0)  Diet Effective Now         02/28/24 2029 02/28/24 2030  Advance Diet As Tolerated -  Until Discontinued         02/28/24 2029 02/28/24 2030  Oxygen Therapy- Nasal Cannula; 2 LPM  Continuous         02/28/24 2029 02/28/24 2030  Transfer Patient  Once         02/28/24 2029 02/28/24 2030  Code Status and Medical Interventions:  Continuous         02/28/24 2029 02/28/24 2030  Vital Signs Per Hospital Policy  Per Hospital Policy         02/28/24 2029 02/28/24 2030  Notify Physician  Until Discontinued         02/28/24 2029 02/28/24 2030  Up As Tolerated  Until Discontinued         02/28/24 2029 02/28/24 2030  Patient May Shower  Once        Comments: After anesthesia wears off and with assistance    02/28/24 2029 02/28/24 2030  Advance Diet  "As Tolerated -Regular  Until Discontinued         02/28/24 2029 02/28/24 2030  I/O  Every Shift       02/28/24 2029 02/28/24 2030  Fundal and Lochia Check  Per Hospital Policy        Comments: Q 30 min x 2, Q 1 hr x 4, Q 4 hrs x 24 hrs, then Q shift.    02/28/24 2029 02/28/24 2030  Straight Catheterize  Once        Comments: May Straight Cath One Time As Need for Inability to Void  If Necessary to Cath a Second Time, Insert Indwelling Urinary Catheter & Leave in If Residual is Greater Than 150 mL    02/28/24 2029 02/28/24 2030  Continue Indwelling Urinary Catheter Already in Place  Once         02/28/24 2029 02/28/24 2030  Notify Provider if Bladder Distention Continues  Until Discontinued         02/28/24 2029 02/28/24 2030  Turn Cough Deep Breathe  Once         02/28/24 2029 02/28/24 2030  Encourage early intake of PO fluids  Continuous         02/28/24 2029 02/28/24 2030  Ambulate Patient 3-5 times per day (with or without Garcia)  Every Shift       02/28/24 2029 02/28/24 2030  Apply Abdominal Binding Until Discontinued  Until Discontinued         02/28/24 2029 02/28/24 2030  \"If patient tolerates food and liquids after completion of second bag of Pitocin, saline lock IV and discontinue IV fluid infusions.  Once         02/28/24 2029 02/28/24 2030  Breast pump to bed  Once         02/28/24 2029 02/28/24 2030  If indicated -- Please administer RH Immunoglobulin based on results of cord blood evaluation and fetal screen lab tests, pharmacy to dispense  Continuous        Comments: See process instructions for reference range details.    02/28/24 2029 02/28/24 2030  Place Sequential Compression Device  Once         02/28/24 2029 02/28/24 2030  Maintain Sequential Compression Device  Continuous         02/28/24 2029 02/28/24 2029  oxytocin (PITOCIN) 30 units in 0.9% sodium chloride 500 mL (premix)  Once As Needed         02/28/24 2029 02/28/24 2029  simethicone (MYLICON) " chewable tablet 80 mg  4 Times Daily PRN         02/28/24 2029 02/28/24 2029  ondansetron ODT (ZOFRAN-ODT) disintegrating tablet 4 mg  Every 6 Hours PRN        See Hyperspace for full Linked Orders Report.    02/28/24 2029 02/28/24 2029  ondansetron (ZOFRAN) injection 4 mg  Every 6 Hours PRN        See Hyperspace for full Linked Orders Report.    02/28/24 2029 02/28/24 2029  promethazine (PHENERGAN) tablet 12.5 mg  Every 4 Hours PRN         02/28/24 2029 02/28/24 2029  hydrOXYzine (ATARAX) tablet 50 mg  Every 6 Hours PRN         02/28/24 2029 02/28/24 2029  Hydrocortisone (Perianal) (ANUSOL-HC) 2.5 % rectal cream  3 Times Daily PRN         02/28/24 2029 02/28/24 2029  oxyCODONE (ROXICODONE) immediate release tablet 5 mg  Every 4 Hours PRN        See Hyperspace for full Linked Orders Report.    02/28/24 2029 02/28/24 2029  oxyCODONE (ROXICODONE) immediate release tablet 10 mg  Every 4 Hours PRN        See Hyperspace for full Linked Orders Report.    02/28/24 2029 02/28/24 2029  methylergonovine (METHERGINE) injection 200 mcg  Once As Needed         02/28/24 2029 02/28/24 2029  carboprost (HEMABATE) injection 250 mcg  As Needed         02/28/24 2029 02/28/24 1700  Respirations  Every Hour,   Status:  Canceled      Comments: If respiratory rate is less than 10/min, notify the Anesthesiologist    02/28/24 1639 02/28/24 1640  If respiratory is less than 8/min, see Narcan order below. Notify Anesthesiologist STAT.  Until Discontinued,   Status:  Canceled         02/28/24 1639 02/28/24 1640  Blood Pressure and Pulse Every 4 Hours  Continuous,   Status:  Canceled         02/28/24 1639 02/28/24 1640  Activity & Removal of Garcia Catheter, Per Obstetrician  Continuous,   Status:  Canceled         02/28/24 1639 02/28/24 1640  Notify Anesthesiologist for Any Questions / Problems  Continuous,   Status:  Canceled         02/28/24 1639    02/28/24 1640  Notify Anesthesia for Temp Over  101.4F  Continuous,   Status:  Canceled         02/28/24 1639    02/28/24 1640  Ambu Bag at Bedside  Continuous,   Status:  Canceled         02/28/24 1639    02/28/24 1639  IV Site Care  As Needed,   Status:  Canceled       02/28/24 1639    02/28/24 1639  ondansetron (ZOFRAN) injection 4 mg  Once As Needed,   Status:  Discontinued         02/28/24 1639    02/28/24 1639  diphenhydrAMINE (BENADRYL) capsule 25 mg  Every 4 Hours PRN,   Status:  Discontinued        See Hyperspace for full Linked Orders Report.    02/28/24 1639    02/28/24 1639  diphenhydrAMINE (BENADRYL) injection 25 mg  Every 4 Hours PRN,   Status:  Discontinued        See Hyperspace for full Linked Orders Report.    02/28/24 1639    02/28/24 1639  diphenhydrAMINE (BENADRYL) injection 25 mg  Every 4 Hours PRN,   Status:  Discontinued        See Hyperspace for full Linked Orders Report.    02/28/24 1639    02/28/24 1639  naloxone (NARCAN) injection 0.1 mg  Every 1 Hour PRN,   Status:  Discontinued         02/28/24 1639    02/28/24 1639  morphine injection 2 mg  Every 1 Hour PRN,   Status:  Discontinued         02/28/24 1639    02/28/24 1625  sodium chloride (NS) irrigation solution  As Needed         02/28/24 1625    02/28/24 1600  Vital Signs q 4 while awake  Every 4 Hours,   Status:  Canceled      Comments: While the patient is awake.    02/28/24 1248    02/28/24 1445  oxytocin (PITOCIN) 30 units in 0.9% sodium chloride 500 mL (premix)  Continuous        See Hyperspace for full Linked Orders Report.    02/28/24 1340    02/28/24 1430  ketorolac (TORADOL) injection 30 mg  Once         02/28/24 1340    02/28/24 1345  sodium chloride 0.9 % flush 10 mL  Every 12 Hours Scheduled,   Status:  Discontinued         02/28/24 1245    02/28/24 1345  lactated ringers infusion  Once,   Status:  Discontinued         02/28/24 1245    02/28/24 1345  lactated ringers bolus 1,000 mL  Once,   Status:  Discontinued         02/28/24 1248    02/28/24 1345  lactated ringers  infusion  Continuous,   Status:  Discontinued         02/28/24 1248    02/28/24 1345  acetaminophen (TYLENOL) tablet 1,000 mg  Once         02/28/24 1248    02/28/24 1345  ceFAZolin 2000 mg IVPB in 100 mL NS (VTB)  Once         02/28/24 1248    02/28/24 1345  oxytocin (PITOCIN) 30 units in 0.9% sodium chloride 500 mL (premix)  Once,   Status:  Discontinued        See Hyperspace for full Linked Orders Report.    02/28/24 1340    02/28/24 1340  miSOPROStol (CYTOTEC) tablet 600 mcg  As Needed,   Status:  Discontinued         02/28/24 1340    02/28/24 1248  Code Status and Medical Interventions:  Continuous,   Status:  Canceled         02/28/24 1248    02/28/24 1248  Obtain Informed Consent  Once,   Status:  Canceled         02/28/24 1248    02/28/24 1248  Vital Signs Per Hospital Policy  Per Hospital Policy,   Status:  Canceled         02/28/24 1248    02/28/24 1248  Continuous Fetal Monitoring With NST on Admission and Prior to Initiation of Oxytocin.  Per Order Details,   Status:  Canceled        Comments: Continuous Fetal Monitoring With NST on Admission    02/28/24 1248    02/28/24 1248  External Uterine Contraction Monitoring  Per Hospital Policy,   Status:  Canceled         02/28/24 1248    02/28/24 1248  Notify Provider (Specified)  Until Discontinued,   Status:  Canceled         02/28/24 1248    02/28/24 1248  Notify Provider of Tachysystole (Per Hospital Algorithm)  Until Discontinued,   Status:  Canceled         02/28/24 1248    02/28/24 1248  Notify Provider if Membranes Ruptured, Bleeding Greater Than 1 Pad Per Hour, Fetal Heart Tone Abnormality or Severe Pain  Until Discontinued,   Status:  Canceled         02/28/24 1248    02/28/24 1248  Up as Tolerated  Until Discontinued,   Status:  Canceled         02/28/24 1248    02/28/24 1248  Initiate Group Beta Strep (GBS) Prophylaxis Protocol, If Criteria Met  Continuous,   Status:  Canceled        Comments: NO TREATMENT RECOMMENDED IF: 1) Maternal GBS Status  Known Negative 2) Scheduled  Birth With Intact Membranes, Not in Labor 3) Maternal GBS Status Unknown, No Risk Factors  TREAT WITH ANTIBIOTICS IF:  1) Maternal GBS Status Known Positive 2) Maternal GBS Status Unknown With Risk Factors: a)  Previous Infant Affected By GBS Infection b) GBS Urinary Tract Infection (UTI) or Bacteriuria During Pregnancy c) Unexplained Maternal Fever (100.4F (38C) or Greater) During Labor d)  Prolonged Rupture of Membranes (18 or More Hours) e) Gestational Age Less Than 37 Weeks    24 1248    24 1248  Insert Indwelling Urinary Catheter  Once,   Status:  Canceled        See Hyperspace for full Linked Orders Report.    24 1248    24 1248  Assess Need for Indwelling Urinary Catheter - Follow Removal Protocol  Continuous,   Status:  Canceled        Comments: Indwelling Urinary Catheter Removal Criteria  Discontinue Indwelling Urinary Catheter Unless One of the Following is Present  Urinary Retention or Obstruction  Chronic Garcia Catheter Use  End of Life  Critical Illness with Strict I/O   Tract or Abdominal Surgery  Stage 3/4 Sacral / Perineal Wound  Required Activity Restriction: Trauma  Required Activity Restriction: Spine Surgery  If Patient is Being Followed by Urology Contact Them PRIOR to Removal  Do Not Remove Indwelling Urinary Catheter Order is Present with a CLINICAL REASON to Maintain the Catheter. Provider is Required to Include a Clinical Reason to Maintain a Urinary Catheter    Chronic Garcia Catheter Use (Present on Admission)  Assess for Continued Need & Document Medical Necessity  If Infection is Suspected, Contact the Provider       See Hyperspace for full Linked Orders Report.    24 1248    24 1248  Urinary Catheter Care  Every Shift,   Status:  Canceled      See Hyperspace for full Linked Orders Report.    24 1248    24 1248  Abdominal Prep With Clippers  Once,   Status:  Canceled         24 1248    24  1248  Chlorhexadine Skin Prep Unless Otherwise Indicated  Once,   Status:  Canceled         02/28/24 1248    02/28/24 1248  SCD (Sequential Compression Devices)  Once,   Status:  Canceled         02/28/24 1248    02/28/24 1248  POC Glucose Once  Once,   Status:  Canceled         02/28/24 1248    02/28/24 1248  Document Gatorade Consumption Prior to Admission (Yes or No)  Once,   Status:  Canceled         02/28/24 1248    02/28/24 1248  NPO Diet NPO Type: Ice Chips  Diet Effective Now,   Status:  Canceled         02/28/24 1248    02/28/24 1248  Inpatient Anesthesiology Consult  Once,   Status:  Canceled        Specialty:  Anesthesiology  Provider:  (Not yet assigned)    02/28/24 1248    02/28/24 1247  lidocaine (XYLOCAINE) 1 % injection 0.5 mL  Once As Needed,   Status:  Discontinued         02/28/24 1248    02/28/24 1246  Oxygen Therapy- Nasal Cannula; Titrate 1-6 LPM Per SpO2; 90 - 95%  Continuous,   Status:  Canceled         02/28/24 1245    02/28/24 1246  POC Glucose Once  Once,   Status:  Canceled        Comments: For all diabetic patients and all patients who are to be admitted. Notify Anesthesiologist for blood sugar > 180.      02/28/24 1245    02/28/24 1246  POC Urine Pregnancy  STAT,   Status:  Canceled         02/28/24 1245    02/28/24 1246  Insert Peripheral IV  Once,   Status:  Canceled         02/28/24 1245    02/28/24 1246  Saline Lock & Maintain IV Access  Continuous,   Status:  Canceled         02/28/24 1245    02/28/24 1246  May take Beta Blocker from home with sip of water.  Once,   Status:  Canceled         02/28/24 1245    02/28/24 1245  Vital Signs - Per Anesthesia Protocol  As Needed,   Status:  Canceled       02/28/24 1245    02/28/24 1245  sodium chloride 0.9 % flush 10 mL  As Needed,   Status:  Discontinued         02/28/24 1245    02/28/24 1245  sodium chloride 0.9 % infusion 40 mL  As Needed,   Status:  Discontinued         02/28/24 1245    02/28/24 1245  midazolam (VERSED) injection 1 mg   Every 10 Minutes PRN,   Status:  Discontinued         02/28/24 1245    02/28/24 1121  POC Glucose Once  PROCEDURE ONCE        Comments: Complete no more than 45 minutes prior to patient eating      02/28/24 1104    02/28/24 1100  Case request  Once         02/28/24 1101    02/28/24 0819  POC Glucose Once  PROCEDURE ONCE        Comments: Complete no more than 45 minutes prior to patient eating      02/28/24 0812    02/28/24 0800  metFORMIN (GLUCOPHAGE) tablet 500 mg  2 Times Daily With Meals,   Status:  Discontinued         02/27/24 2109    02/28/24 0642  POC Glucose Once  PROCEDURE ONCE        Comments: Complete no more than 45 minutes prior to patient eating      02/28/24 0634    02/27/24 2205  Fentanyl, Urine - Urine, Clean Catch  Once         02/27/24 2204    02/27/24 2100  POC Glucose Once  PROCEDURE ONCE        Comments: Complete no more than 45 minutes prior to patient eating      02/27/24 2052    02/27/24 2000  Vital Signs q 4 while awake  Every 4 Hours,   Status:  Canceled      Comments: While the patient is awake.    02/27/24 1645    02/27/24 2000  Vital Signs Per hospital policy  Every 4 Hours,   Status:  Canceled      Comments: Daily Weight    02/27/24 1645    02/27/24 2000  Measure Blood Pressure  Every 4 Hours,   Status:  Canceled      Comments: Notify MD if > 140/90 x 2.    02/27/24 1645    02/27/24 1900  POC Glucose 4x Daily Fasting & PC  4 Times Daily Fasting & After Meals,   Status:  Canceled      Comments: Complete no more than 45 minutes prior to patient eating      02/27/24 1712    02/27/24 1809  POC Glucose Once  PROCEDURE ONCE        Comments: Complete no more than 45 minutes prior to patient eating      02/27/24 1802    02/27/24 1800  Fetal Nonstress Test  3 Times Daily,   Status:  Canceled      Comments: Patient presents with:  Non-stress Test: PRESENTS FOR NST AND RETURN 24 HOUR URINE.  PT DENIES ANY UC'S, NO LOF AND NO VAG BLEEDING.  PT HAS +FM.      02/27/24 1727    02/27/24 1726  Protein  / Creatinine Ratio, Urine - Urine, Clean Catch  Once         02/27/24 1725    02/27/24 1726  Urine Drug Screen - Urine, Clean Catch  Once         02/27/24 1725    02/27/24 1717  ABO RH Specimen Verification  STAT         02/27/24 1716    02/27/24 1712  Initiate Observation Status  Once         02/27/24 1712    02/27/24 1711  Diet: Regular/House Diet, Cardiac Diets, Diabetic Diets; No Salt Packet; Consistent Carbohydrate; Texture: Regular Texture (IDDSI 7); Fluid Consistency: Thin (IDDSI 0)  Diet Effective Now,   Status:  Canceled         02/27/24 1712    02/27/24 1645  hydrALAZINE (APRESOLINE) injection 5-10 mg  Every 20 Minutes PRN,   Status:  Discontinued        See Hyperspace for full Linked Orders Report.    02/27/24 1645    02/27/24 1645  labetalol (NORMODYNE,TRANDATE) injection 20-80 mg  Every 10 Minutes PRN,   Status:  Discontinued        See Hyperspace for full Linked Orders Report.    02/27/24 1645    02/27/24 1645  NIFEdipine (PROCARDIA) capsule 10-20 mg  Every 20 Minutes PRN,   Status:  Discontinued        See Hyperspace for full Linked Orders Report.    02/27/24 1645    02/27/24 1645  CBC & Differential  STAT         02/27/24 1645    02/27/24 1645  Comprehensive Metabolic Panel  STAT         02/27/24 1645    02/27/24 1645  CBC Auto Differential  PROCEDURE ONCE         02/27/24 1645    02/27/24 1644  Type & Screen  STAT         02/27/24 1645    02/27/24 1643  Code Status and Medical Interventions:  Continuous,   Status:  Canceled         02/27/24 1645    02/27/24 1643  Obtain Informed Consent  Once,   Status:  Canceled         02/27/24 1645    02/27/24 1643  Intermittent Auscultation FOR LOW RISK PATIENTS - NST on Admission Along With Intermittent Auscultation of Fetal Heart Tones.  Per Order Details,   Status:  Canceled        Comments: Intermittent Auscultation FOR LOW RISK PATIENTS - NST on Admission Along With Intermittent Auscultation of Fetal Heart Tones.    02/27/24 1645    02/27/24 1643  NST Low  risk >24 weeks:  Monitor for 30 minutes BID (Antepartum)  Once,   Status:  Canceled        Comments: Fetal monitoring/NST:  Antepartum >24 weeks monitor fetus 30 minutes twice daily    02/27/24 1645    02/27/24 1643  Antepartum Patients  <24 Weeks - Document Fetal Heart Tones Daily and PRN.  Per Order Details,   Status:  Canceled        Comments: For Antepartum Patients Less Than 24 Weeks - Document Fetal Heart Tones Daily & PRN.    02/27/24 1645    02/27/24 1643  Notify Physician (specified)  Until Discontinued,   Status:  Canceled         02/27/24 1645    02/27/24 1643  Notify physician for hyperstimulus (per hospital algorithm)  Until Discontinued,   Status:  Canceled         02/27/24 1645    02/27/24 1643  Notify physician if membranes ruptured, bleeding greater than 1 pad an hour, fetal heart tone abnormality, and severe pain  Until Discontinued,   Status:  Canceled         02/27/24 1645    02/27/24 1643  Bed Rest with Bathroom Privileges  Once,   Status:  Canceled         02/27/24 1645    02/27/24 1643  NPO Diet NPO Type: Strict NPO  Diet Effective Now,   Status:  Canceled         02/27/24 1645    02/27/24 1643  Advance Diet As Tolerated -  Until Discontinued,   Status:  Canceled         02/27/24 1645    02/27/24 1643  Notify physician if membranes ruptured, bleeding greater than 1 pad an hour, fetal heart tone abnormality, and severe pain  Until Discontinued,   Status:  Canceled         02/27/24 1645    02/27/24 1643  T Pallidum Antibody w/ reflex RPR  Once         02/27/24 1645    02/27/24 1643  Protime-INR  Once         02/27/24 1645    02/27/24 1643  aPTT  Once         02/27/24 1645    02/27/24 1643  Fibrinogen  Once         02/27/24 1645    02/27/24 1643  Insert Peripheral IV  Once,   Status:  Canceled         02/27/24 1645    02/27/24 1643  Saline Lock & Maintain IV Access  Continuous,   Status:  Canceled         02/27/24 1645    02/27/24 1643  Place Venous Foot Pump  Once,   Status:  Canceled          02/27/24 1645    02/27/24 1643  Maintain Venous Foot Pump  Once,   Status:  Canceled         02/27/24 1645    02/27/24 1642  sodium chloride 0.9 % flush 10 mL  As Needed,   Status:  Discontinued         02/27/24 1645    02/27/24 1642  sodium chloride 0.9 % infusion 40 mL  As Needed,   Status:  Discontinued         02/27/24 1645    02/27/24 1600  Vital Signs q 4 while awake  Every 4 Hours,   Status:  Canceled      Comments: While the patient is awake.    02/27/24 1559    02/27/24 1600  Protein, Urine, 24 Hour - Urine, Clean Catch  Once         02/27/24 1559    02/27/24 1559  Outpatient In A Bed  Once        Comments: No chief complaint on file.      02/27/24 1559    02/27/24 1559  Vital Signs Per Hospital Policy  Per Hospital Policy,   Status:  Canceled         02/27/24 1559    02/27/24 1559  External Uterine Contraction Monitoring  Per Hospital Policy,   Status:  Canceled         02/27/24 1559    02/27/24 1559  Notify Provider (Specified)  Until Discontinued,   Status:  Canceled         02/27/24 1559    02/27/24 1559  Notify Provider of Tachysystole (Per Hospital Algorithm)  Until Discontinued,   Status:  Canceled         02/27/24 1559    02/27/24 1559  Notify Provider if Membranes Ruptured, Bleeding Greater Than 1 Pad Per Hour, Fetal Heart Tone Abnormality or Severe Pain  Until Discontinued,   Status:  Canceled         02/27/24 1559    02/27/24 1559  NPO Diet NPO Type: Ice Chips  Diet Effective Now,   Status:  Canceled         02/27/24 1559    02/27/24 1559  For Antepartum Patients Greater Than 24 Weeks - NST Daily and Monitor Fetal Heart Tones for One Hour 3 Times Daily and PRN.  Per Order Details,   Status:  Canceled        Comments: For Antepartum Patients Greater Than 24 Weeks - NST Daily & Monitor Fetal Heart Tones For One Hour 3 Times Daily & PRN.    02/27/24 1559    Unscheduled  Fundal & Lochia Check  As Needed      Comments: Every 15 Minutes x4, Then Every 30 Minutes x2, Then Every Shift    02/28/24 2029     Unscheduled  Blood Gas, Arterial -With Co-Ox Panel: Yes  As Needed       24 1639    Unscheduled  Up with Assistance  As Needed       24    Unscheduled  Bladder Scan if Patient Unable to Void 4-6 Hours After Catheter Removal  As Needed         24    Unscheduled  Straight Cath Every 4-6 Hours As Needed If Patient is Unable to Void After 4-6 Hours, Bladder Scan Volume is Greater Than 350-500mL & Patient Has Symptoms of Bladder Discomfort / Distention  As Needed       24    Unscheduled  Schedule / Prompt Voiding For Patients With Urinary Incontinence  As Needed       24    Unscheduled  Wound Care  As Needed      Comments: Postop day 1. Remove dressing and leave incision open to air.    24    Unscheduled  Chewing Gum  As Needed       24    Unscheduled  Warm compress  As Needed       24    Unscheduled  Apply ace wrap, tight bra, or binder  As Needed       24    Unscheduled  Apply ice packs  As Needed       24    --  metFORMIN ER (GLUCOPHAGE-XR) 500 MG 24 hr tablet  2 Times Daily         24    --  glyburide (DIAbeta) 2.5 MG tablet  Daily With Breakfast,   Status:  Discontinued         24                     Operative/Procedure Notes (all)        Angelique Menendez DO at 24 1623  Version 1 of 1         Primary Low Transverse  Section Operation Note    Joanna Hernandez  2024  36w5d     Pre-op Diagnosis:   Severe GHTN  A2DM  Macrosomia    Post-op Diagnosis:     Post-Op Diagnosis Codes:  RENA    Procedure(s):   SECTION PRIMARY     Surgeon(s):  Angelique Menendez DO    Anesthesia: Spinal    Staff:   Circulator: Moraima Hermosillo, RN  Baby Nurse: Kelly Bonilla RN; Belen Graves RN  Assistant: Nora Layton  OB TECH: Veronica Guerrero    Estimated Blood Loss:  850 cc  I/O this shift:  In: 700 [I.V.:600; IV Piggyback:100]  Out: 650 [Urine:50; Blood:600]    Time:      Grafts/Implants: NA    Procedure     The patient was placed in the dorsal supine position.  She was then prepped and draped in the normal sterile fashion.  Spinal anesthesia was found to be adequate.  A Pfannenstiel skin incision was made with the scalpel and carried down to the underlying layer of fascia.  The fascia was then incised and extended bilaterally with the Aragon scissors.  The superior aspect of this incision was grasped with Kocher clamps x 2, tented upward and dissected off with Aragon scissors.  The clamps were removed and placed on the inferior aspect of the incision, tented upward and dissected off the rectus muscle to the pubic symphysis.  The rectus muscle was then  in the midline.  The peritoneum was identified, grasped with hemostats x 2 and entered sharply with Metzenbaum scissors.  This incision was then extended superiorly and inferiorly.  The bladder blade was then inserted.  The vesicouterine peritoneum was grasped and entered sharply with Metzenbaum scissors and extended bilaterally.  The bladder blade was re-inserted.  A transverse uterine incision was then made with the scalpel and extended in a cranio-caudal  Blunt fashion.  Bladder blade was removed. The infants head was then grasped and brought through the hysterotomy.  The mouth and nares was bulb suctioned.  The shoulders and body were then delivered atraumatically.  The cord was then doubly clamped and cut and the infant was handed off to the nursing staff with vigorous cry and movement of all extremities.  Cord blood was obtained.  The placenta was manually extracted.  The uterus was exteriorized and cleared of all clot and debris.  The hysterotomy was grasped with Allis clamps at the edge and repaired with 1-0 Monocryl in a running and locked fashion.  A second layer of the same suture was made.  The posterior cul-de-sac was irrigated and suctioned. The uterine incision  was hemostatic and the uterus was returned to the  abdomen.  The gutters were cleared of all clot and debris.  Second look at the uterine incision was hemostatic.  The rectus muscle was visualized and hemostatic.  Fascia was then grasped with Kocher clamps and re-approximated with 0  double loop PDS in a single layer and running fashion.  The subcutaneous layer was irrigated and hemostatic with Bovie cautery.  This layer was then re-approximated with 2-0 plain gut stitch.  The skin was re-approximated with 3-0 Vicryl on a Bret needle.  Sterile dressing was applied to the incision.  Uterus was found to be firm and at the umbilicus.  Sponge, lap and needle counts were correct.  Patient was taken to recovery in stable condition.     APGARS  8 @ 1 minute and 9 @ 5 minute    GENDER  BOY    Complications: None    Angelique Menendez DO     Date: 2/28/2024  Time: 17:10 EST      Electronically signed by Angelique Menendez DO at 02/28/24 4897       Physician Progress Notes (all)    No notes of this type exist for this encounter.

## 2024-03-01 VITALS
DIASTOLIC BLOOD PRESSURE: 80 MMHG | TEMPERATURE: 98.7 F | OXYGEN SATURATION: 98 % | HEART RATE: 92 BPM | WEIGHT: 289.2 LBS | HEIGHT: 64 IN | SYSTOLIC BLOOD PRESSURE: 148 MMHG | BODY MASS INDEX: 49.37 KG/M2 | RESPIRATION RATE: 18 BRPM

## 2024-03-01 PROCEDURE — 25010000002 ENOXAPARIN PER 10 MG: Performed by: OBSTETRICS & GYNECOLOGY

## 2024-03-01 RX ORDER — SIMETHICONE 80 MG
80 TABLET,CHEWABLE ORAL EVERY 6 HOURS PRN
Qty: 40 TABLET | Refills: 1 | Status: SHIPPED | OUTPATIENT
Start: 2024-03-01

## 2024-03-01 RX ORDER — DOCUSATE SODIUM 100 MG/1
100 CAPSULE, LIQUID FILLED ORAL 2 TIMES DAILY PRN
Qty: 40 CAPSULE | Refills: 1 | Status: SHIPPED | OUTPATIENT
Start: 2024-03-01

## 2024-03-01 RX ORDER — IBUPROFEN 600 MG/1
600 TABLET ORAL EVERY 6 HOURS PRN
Qty: 40 TABLET | Refills: 1 | Status: SHIPPED | OUTPATIENT
Start: 2024-03-01

## 2024-03-01 RX ORDER — OXYCODONE HYDROCHLORIDE AND ACETAMINOPHEN 5; 325 MG/1; MG/1
1 TABLET ORAL EVERY 4 HOURS PRN
Qty: 15 TABLET | Refills: 0 | Status: SHIPPED | OUTPATIENT
Start: 2024-03-01

## 2024-03-01 RX ADMIN — POLYETHYLENE GLYCOL (3350) 17 G: 17 POWDER, FOR SOLUTION ORAL at 08:13

## 2024-03-01 RX ADMIN — IBUPROFEN 600 MG: 600 TABLET, FILM COATED ORAL at 08:13

## 2024-03-01 RX ADMIN — ACETAMINOPHEN 650 MG: 325 TABLET ORAL at 01:00

## 2024-03-01 RX ADMIN — ENOXAPARIN SODIUM 40 MG: 40 INJECTION SUBCUTANEOUS at 06:24

## 2024-03-01 RX ADMIN — IBUPROFEN 600 MG: 600 TABLET, FILM COATED ORAL at 03:38

## 2024-03-01 RX ADMIN — ACETAMINOPHEN 650 MG: 325 TABLET ORAL at 06:24

## 2024-03-01 NOTE — DISCHARGE INSTRUCTIONS
You may shower but no tub baths or submersion in water for 6 weeks.  After one week remove your abdominal dressing from your incision.  No tampons douching or intercourse for  6 weeks.  No lifting pushing or pulling over 10-15lbs for 6 weeks.  Notify your doctor for fever, chills, worsening abdominal pain, vaginal bleeding heavier than a period or passing clots, swelling in your hands face or feet or visual changes such as blurry or spotty vision, or headache that dont go away with rest.  Continue to take your blood pressure three times a day.  For readings over 140 on the top or 90 on the bottom please contact your doctor and report this.  A form is attached to record the readings, bring them to your scheduled appointment .

## 2024-03-01 NOTE — DISCHARGE SUMMARY
"[facility]  Delivery Discharge Summary    Primary OB Clinician:     EDC: Estimated Date of Delivery: 3/22/24    Gestational Age:36w5d    Antepartum complications: gestational diabetes and ghtn    Date of Delivery: 2024   Time of Delivery: 4:30 PM     Delivered By:  Angelique Menendez     Delivery Type: , Low Transverse      Tubal Ligation: n/a    Baby:   male    Apgar:  8  @ 1 minute /   Apgar:  9  @ 5 minutes   Weight: 3715 g (8 lb 3 oz)     Anesthesia: Spinal      Intrapartum complications: None    Rh Immune globulin given: not applicable    Subjective     Subjective   Post-Op Day 2 S/P   Subjective  Patient reports:  Pain is controlled with ibuprofen (OTC) and narcotic analgesics including Percocet.  She is up out of bed this am. Tolerating diet. Tolerating po -- normal. Voiding - without difficulty; flatus reported..  Vaginal bleeding is as much as expected.    Breastfeeding: without difficulty.    Objective    Objective:  Vital signs (most recent): Blood pressure 148/80, pulse 92, temperature 98.7 °F (37.1 °C), temperature source Axillary, resp. rate 18, height 162.6 cm (64\"), weight 131 kg (289 lb 3.2 oz), SpO2 98%, currently breastfeeding.    Vitals: Vital Signs Range for the last 24 hours  Temperature: Temp:  [98.7 °F (37.1 °C)] 98.7 °F (37.1 °C)   Temp Source: Temp src: Axillary   BP: BP: (112-148)/(60-80) 148/80   Pulse: Heart Rate:  [86-92] 92   Respirations: Resp:  [16-18] 18   Weight:          Physical Exam    Lungs clear to auscultation bilaterally   Abdomen Soft, ND, tender along incision. + BS   Incision  well approximated   Extremities extremities normal, atraumatic, no cyanosis + edema equal bilaterally no erythema or warmth.       [unfilled]       Lab Results   Component Value Date    ABO A 2024    RH Positive 2024        Lab Results   Component Value Date    HGB 10.5 (L) 2024    HCT 31.7 (L) 2024       Assesment and Plan:       Hypertension affecting " pregnancy    Gestational diabetes    Postpartum care following  delivery    Assessment & Plan    Assessment:    Joanna Hernandez is Day 2  post-partum  , Low Transverse   .      Plan:    Continue post op care and plan for discharge today.     Follow-up appointment with [facility] in 1 week for blood pressure check. Continue to monitor blood pressure, notify clinic with any new or concerning symptoms.    Discharge Date: 3/1/2024; Discharge Time: 09:51 ROSALBA Barraza, APRN  3/1/2024  09:51 EST

## 2024-03-01 NOTE — PLAN OF CARE
Goal Outcome Evaluation:  Plan of Care Reviewed With: patient        Progress: improving  Outcome Evaluation: small rubra/ afebrile/ voiding without difficulty/ states confidence in ability to care for herself and infant at home

## 2024-03-02 NOTE — PAYOR COMM NOTE
"CONTACT:  MEGAN LYN RN  UTILIZATION MANAGEMENT DEPT.   [facility]   [address] [facility], [address]   PHONE:  [phone]   FAX: [phone]         DC NOTIFICATION HOME 3/1/24    REF #[id number]             Gurinder Hernandez (33 y.o. Female)       Date of Birth   1990    Social Security Number       Address   [address] [address]    Home Phone   [phone]    MRN   [id number]       Mormonism   None    Marital Status                               Admission Date   2/27/24    Admission Type   Elective    Admitting Provider   Aden Lyn DO    Attending Provider       Department, Room/Bed   [facility], W[facility]       Discharge Date   3/1/2024    Discharge Disposition   Home or Self Care    Discharge Destination                                 Attending Provider: (none)   Allergies: Penicillins    Isolation: None   Infection: None   Code Status: Prior    Ht: 162.6 cm (64\")   Wt: 131 kg (289 lb 3.2 oz)    Admission Cmt: None   Principal Problem: Hypertension affecting pregnancy [O16.9]                   Active Insurance as of 2/27/2024       Primary Coverage       Payor Plan Insurance Group Employer/Plan Group    [employer] BLUE [employer]OSS [employer] BLUE CROSS BLUE [employer]IELD PPO [id number]       Payor Plan Address Payor Plan Phone Number Payor Plan Fax Number Effective Dates    [address] [phone]  12/1/2022 - None Entered    [place] [id number]         Subscriber Name Subscriber Birth Date Member ID       GURINDER HERNANDEZ 1990 [id number]                     Emergency Contacts        (Rel.) Home Phone Work Phone Mobile Phone    SOPHIAJUAREZ (Spouse) [phone] -- [phone]                 Discharge Summary        Claus Barraza APRN at 03/01/24 0951          [facility]  Delivery Discharge Summary    Primary OB Clinician:     EDC: Estimated Date of Delivery: 3/22/24    Gestational Age:36w5d    Antepartum complications: " "gestational diabetes and ghtn    Date of Delivery: 2024   Time of Delivery: 4:30 PM     Delivered By:  Angelique Menendez     Delivery Type: , Low Transverse      Tubal Ligation: n/a    Baby:   male    Apgar:  8  @ 1 minute /   Apgar:  9  @ 5 minutes   Weight: 3715 g (8 lb 3 oz)     Anesthesia: Spinal      Intrapartum complications: None    Rh Immune globulin given: not applicable    Subjective    Subjective  Post-Op Day 2 S/P   Subjective  Patient reports:  Pain is controlled with ibuprofen (OTC) and narcotic analgesics including Percocet.  She is up out of bed this am. Tolerating diet. Tolerating po -- normal. Voiding - without difficulty; flatus reported..  Vaginal bleeding is as much as expected.    Breastfeeding: without difficulty.    Objective   Objective:  Vital signs (most recent): Blood pressure 148/80, pulse 92, temperature 98.7 °F (37.1 °C), temperature source Axillary, resp. rate 18, height 162.6 cm (64\"), weight 131 kg (289 lb 3.2 oz), SpO2 98%, currently breastfeeding.    Vitals: Vital Signs Range for the last 24 hours  Temperature: Temp:  [98.7 °F (37.1 °C)] 98.7 °F (37.1 °C)   Temp Source: Temp src: Axillary   BP: BP: (112-148)/(60-80) 148/80   Pulse: Heart Rate:  [86-92] 92   Respirations: Resp:  [16-18] 18   Weight:          Physical Exam    Lungs clear to auscultation bilaterally   Abdomen Soft, ND, tender along incision. + BS   Incision  well approximated   Extremities extremities normal, atraumatic, no cyanosis + edema equal bilaterally no erythema or warmth.       [unfilled]       Lab Results   Component Value Date    ABO A 2024    RH Positive 2024        Lab Results   Component Value Date    HGB 10.5 (L) 2024    HCT 31.7 (L) 2024       Assesment and Plan:       Hypertension affecting pregnancy    Gestational diabetes    Postpartum care following  delivery    Assessment & Plan    Assessment:    Joanna eHrnandez is Day 2  post-partum  , Low " Transverse   .      Plan:    Continue post op care and plan for discharge today.     Follow-up appointment with [facility] in 1 week for blood pressure check. Continue to monitor blood pressure, notify clinic with any new or concerning symptoms.    Discharge Date: 3/1/2024; Discharge Time: 09:51 EST        NOEMÍ Silver  3/1/2024  09:51 EST    Electronically signed by Claus Barraza APRN at 03/01/24 0953       Discharge Order (From admission, onward)       Start     Ordered    03/01/24 1012  Discharge patient  Once        Expected Discharge Date: 03/01/24   Discharge Disposition: Home or Self Care   Physician of Record for Attribution - Please select from Treatment Team: SALENA LYN [[id number]]   Review needed by CMO to determine Physician of Record: No      Question Answer Comment   Physician of Record for Attribution - Please select from Treatment Team SALENA LYN    Review needed by CMO to determine Physician of Record No        03/01/24 1012

## 2024-03-07 ENCOUNTER — MATERNAL SCREENING (OUTPATIENT)
Dept: CALL CENTER | Facility: HOSPITAL | Age: 34
End: 2024-03-07
Payer: COMMERCIAL

## 2024-03-07 NOTE — OUTREACH NOTE
Maternal Screening Survey      Flowsheet Row Responses   Facility patient discharged from? [facility]   Attempt successful? Yes   Call start time 1259   Call end time 1301   EPD Scale: Able to Laugh 0-->as much as she always could   EPD Scale: Looked Forward 0-->as much as she ever did   EPD Scale: Blamed Self 0-->no, never   EPD Scale: Been Anxious 0-->no, not at all   EPD Scale: Felt Panicky 0-->no, not at all   EPD Scale: Things Getting on Top 0-->no, has been coping as well as ever   EPD Scale: Difficulty Sleeping 0-->no, not at all   EPD Scale: Sad or Miserable 0-->no, not at all   EPD Scale: Crying 0-->no, never   EPD Scale: Thought of Harming Self 0-->never   [place]  Depression Score 0   Did any of your parents have problems with alcohol or drug use? No   Do any of your peers have problems with alcohol or drug use? No   Does your partner have problems with alcohol or drug use? No   Before you were pregnant did you have problems with alcohol or drug use? (past) No   In the past month, did you drink beer, wine, liquor or use any other drugs? (pregnancy) No   Maternal Screening call completed Yes   Wrap up additional comments Doing well.   Call end time 1301              Vesna COKER - Registered Nurse

## 2024-03-08 ENCOUNTER — HOSPITAL ENCOUNTER (EMERGENCY)
Facility: HOSPITAL | Age: 34
Discharge: HOME | End: 2024-03-08
Attending: STUDENT IN AN ORGANIZED HEALTH CARE EDUCATION/TRAINING PROGRAM
Payer: COMMERCIAL

## 2024-03-08 ENCOUNTER — APPOINTMENT (OUTPATIENT)
Dept: GENERAL RADIOLOGY | Facility: HOSPITAL | Age: 34
End: 2024-03-08
Payer: COMMERCIAL

## 2024-03-08 VITALS
OXYGEN SATURATION: 97 % | HEIGHT: 64 IN | HEART RATE: 75 BPM | WEIGHT: 272 LBS | TEMPERATURE: 98.4 F | BODY MASS INDEX: 46.44 KG/M2 | DIASTOLIC BLOOD PRESSURE: 71 MMHG | SYSTOLIC BLOOD PRESSURE: 133 MMHG | RESPIRATION RATE: 14 BRPM

## 2024-03-08 DIAGNOSIS — N39.0 ACUTE UTI: Primary | ICD-10-CM

## 2024-03-08 LAB
ALBUMIN SERPL-MCNC: 3.4 G/DL (ref 3.5–5.2)
ALBUMIN/GLOB SERPL: 1.1 G/DL
ALP SERPL-CCNC: 69 U/L (ref 39–117)
ALT SERPL W P-5'-P-CCNC: 10 U/L (ref 1–33)
ANION GAP SERPL CALCULATED.3IONS-SCNC: 11.4 MMOL/L (ref 5–15)
AST SERPL-CCNC: 14 U/L (ref 1–32)
BACTERIA UR QL AUTO: ABNORMAL /HPF
BASOPHILS # BLD AUTO: 0.06 10*3/MM3 (ref 0–0.2)
BASOPHILS NFR BLD AUTO: 0.6 % (ref 0–1.5)
BILIRUB SERPL-MCNC: 0.3 MG/DL (ref 0–1.2)
BILIRUB UR QL STRIP: NEGATIVE
BUN SERPL-MCNC: 10 MG/DL (ref 6–20)
BUN/CREAT SERPL: 12.8 (ref 7–25)
CALCIUM SPEC-SCNC: 8.8 MG/DL (ref 8.6–10.5)
CHLORIDE SERPL-SCNC: 105 MMOL/L (ref 98–107)
CLARITY UR: ABNORMAL
CO2 SERPL-SCNC: 24.6 MMOL/L (ref 22–29)
COLOR UR: ABNORMAL
CREAT SERPL-MCNC: 0.78 MG/DL (ref 0.57–1)
DEPRECATED RDW RBC AUTO: 45.1 FL (ref 37–54)
EGFRCR SERPLBLD CKD-EPI 2021: 103.6 ML/MIN/1.73
EOSINOPHIL # BLD AUTO: 0.18 10*3/MM3 (ref 0–0.4)
EOSINOPHIL NFR BLD AUTO: 1.9 % (ref 0.3–6.2)
ERYTHROCYTE [DISTWIDTH] IN BLOOD BY AUTOMATED COUNT: 14 % (ref 12.3–15.4)
GLOBULIN UR ELPH-MCNC: 3 GM/DL
GLUCOSE SERPL-MCNC: 104 MG/DL (ref 65–99)
GLUCOSE UR STRIP-MCNC: NEGATIVE MG/DL
HCT VFR BLD AUTO: 33.2 % (ref 34–46.6)
HGB BLD-MCNC: 10.8 G/DL (ref 12–15.9)
HGB UR QL STRIP.AUTO: ABNORMAL
HYALINE CASTS UR QL AUTO: ABNORMAL /LPF
IMM GRANULOCYTES # BLD AUTO: 0.04 10*3/MM3 (ref 0–0.05)
IMM GRANULOCYTES NFR BLD AUTO: 0.4 % (ref 0–0.5)
KETONES UR QL STRIP: NEGATIVE
LEUKOCYTE ESTERASE UR QL STRIP.AUTO: ABNORMAL
LYMPHOCYTES # BLD AUTO: 1.13 10*3/MM3 (ref 0.7–3.1)
LYMPHOCYTES NFR BLD AUTO: 11.7 % (ref 19.6–45.3)
MCH RBC QN AUTO: 29.1 PG (ref 26.6–33)
MCHC RBC AUTO-ENTMCNC: 32.5 G/DL (ref 31.5–35.7)
MCV RBC AUTO: 89.5 FL (ref 79–97)
MONOCYTES # BLD AUTO: 0.58 10*3/MM3 (ref 0.1–0.9)
MONOCYTES NFR BLD AUTO: 6 % (ref 5–12)
NEUTROPHILS NFR BLD AUTO: 7.66 10*3/MM3 (ref 1.7–7)
NEUTROPHILS NFR BLD AUTO: 79.4 % (ref 42.7–76)
NITRITE UR QL STRIP: NEGATIVE
NRBC BLD AUTO-RTO: 0 /100 WBC (ref 0–0.2)
PH UR STRIP.AUTO: 8.5 [PH] (ref 5–8)
PLATELET # BLD AUTO: 270 10*3/MM3 (ref 140–450)
PMV BLD AUTO: 9.5 FL (ref 6–12)
POTASSIUM SERPL-SCNC: 4.2 MMOL/L (ref 3.5–5.2)
PROT SERPL-MCNC: 6.4 G/DL (ref 6–8.5)
PROT UR QL STRIP: ABNORMAL
RBC # BLD AUTO: 3.71 10*6/MM3 (ref 3.77–5.28)
RBC # UR STRIP: ABNORMAL /HPF
REF LAB TEST METHOD: ABNORMAL
SODIUM SERPL-SCNC: 141 MMOL/L (ref 136–145)
SP GR UR STRIP: 1.01 (ref 1–1.03)
SQUAMOUS #/AREA URNS HPF: ABNORMAL /HPF
UROBILINOGEN UR QL STRIP: ABNORMAL
WBC # UR STRIP: ABNORMAL /HPF
WBC NRBC COR # BLD AUTO: 9.65 10*3/MM3 (ref 3.4–10.8)

## 2024-03-08 PROCEDURE — 85025 COMPLETE CBC W/AUTO DIFF WBC: CPT | Performed by: STUDENT IN AN ORGANIZED HEALTH CARE EDUCATION/TRAINING PROGRAM

## 2024-03-08 PROCEDURE — 36415 COLL VENOUS BLD VENIPUNCTURE: CPT

## 2024-03-08 PROCEDURE — 71045 X-RAY EXAM CHEST 1 VIEW: CPT

## 2024-03-08 PROCEDURE — 81001 URINALYSIS AUTO W/SCOPE: CPT | Performed by: STUDENT IN AN ORGANIZED HEALTH CARE EDUCATION/TRAINING PROGRAM

## 2024-03-08 PROCEDURE — 87086 URINE CULTURE/COLONY COUNT: CPT | Performed by: STUDENT IN AN ORGANIZED HEALTH CARE EDUCATION/TRAINING PROGRAM

## 2024-03-08 PROCEDURE — 80053 COMPREHEN METABOLIC PANEL: CPT | Performed by: STUDENT IN AN ORGANIZED HEALTH CARE EDUCATION/TRAINING PROGRAM

## 2024-03-08 PROCEDURE — 99283 EMERGENCY DEPT VISIT LOW MDM: CPT

## 2024-03-08 PROCEDURE — 71045 X-RAY EXAM CHEST 1 VIEW: CPT | Performed by: RADIOLOGY

## 2024-03-08 RX ORDER — CEPHALEXIN 500 MG/1
500 CAPSULE ORAL 2 TIMES DAILY
Qty: 13 CAPSULE | Refills: 0 | Status: SHIPPED | OUTPATIENT
Start: 2024-03-08

## 2024-03-08 RX ORDER — CEPHALEXIN 250 MG/1
500 CAPSULE ORAL ONCE
Status: COMPLETED | OUTPATIENT
Start: 2024-03-08 | End: 2024-03-08

## 2024-03-08 RX ADMIN — CEPHALEXIN 500 MG: 250 CAPSULE ORAL at 18:38

## 2024-03-08 NOTE — ED PROVIDER NOTES
Subjective   History of Present Illness  33-year-old female with a recent  x 1 week presents to the ER from a local OB/GYN office due to concerns for elevated blood pressure readings.  Patient has no chest pain.  No headache.  No vision changes.  No abdominal pain.  No changes in bowel or urinary habits.  No shortness of breath.  No diaphoresis.  Vital signs stable.  Afebrile      Review of Systems   Constitutional:  Negative for activity change and appetite change.   HENT:  Negative for congestion, rhinorrhea, sinus pressure, sinus pain and sore throat.    Eyes:  Negative for pain and discharge.   Respiratory:  Negative for apnea, cough and shortness of breath.    Cardiovascular:  Negative for chest pain and palpitations.   Gastrointestinal:  Negative for abdominal distention, abdominal pain, blood in stool, diarrhea, nausea and vomiting.   Endocrine: Negative for cold intolerance.   Genitourinary:  Negative for dysuria, flank pain and hematuria.   Musculoskeletal:  Negative for arthralgias and myalgias.   Skin:  Negative for color change and rash.   Neurological:  Negative for dizziness, seizures, facial asymmetry, speech difficulty, weakness, numbness and headaches.   Psychiatric/Behavioral:  Negative for agitation, behavioral problems and confusion.        Past Medical History:   Diagnosis Date    Anxiety 2021    Gestational diabetes     HTN (hypertension)        Allergies   Allergen Reactions    Penicillins Rash       Past Surgical History:   Procedure Laterality Date     SECTION Bilateral 2024    Procedure:  SECTION PRIMARY;  Surgeon: Angelique Menendez DO;  Location: [facility] LABOR DELIVERY;  Service: Obstetrics/Gynecology;  Laterality: Bilateral;    TONSILLECTOMY         Family History   Problem Relation Age of Onset    Hypertension Mother     Hypertension Father        Social History     Socioeconomic History    Marital status:    Tobacco Use    Smoking status: Never     Smokeless tobacco: Never   Vaping Use    Vaping status: Never Used   Substance and Sexual Activity    Alcohol use: Never    Drug use: Never    Sexual activity: Yes     Partners: Male           Objective   Physical Exam  Constitutional:       General: She is not in acute distress.     Appearance: Normal appearance. She is not ill-appearing.   HENT:      Head: Normocephalic and atraumatic.      Right Ear: External ear normal.      Left Ear: External ear normal.      Nose: Nose normal.      Mouth/Throat:      Mouth: Mucous membranes are moist.   Eyes:      Extraocular Movements: Extraocular movements intact.      Pupils: Pupils are equal, round, and reactive to light.   Cardiovascular:      Rate and Rhythm: Normal rate and regular rhythm.      Heart sounds: No murmur heard.  Pulmonary:      Effort: Pulmonary effort is normal. No respiratory distress.      Breath sounds: Normal breath sounds. No wheezing.   Abdominal:      General: Bowel sounds are normal.      Palpations: Abdomen is soft.      Tenderness: There is no abdominal tenderness.   Musculoskeletal:         General: No deformity or signs of injury. Normal range of motion.      Cervical back: Normal range of motion and neck supple.   Skin:     General: Skin is warm and dry.      Findings: No erythema.   Neurological:      General: No focal deficit present.      Mental Status: She is alert and oriented to person, place, and time. Mental status is at baseline.      Cranial Nerves: No cranial nerve deficit.   Psychiatric:         Mood and Affect: Mood normal.         Behavior: Behavior normal.         Thought Content: Thought content normal.         Procedures           ED Course      XR Chest 1 View    Result Date: 3/8/2024   1.  No acute process seen in the chest 2.  No lobar consolidation or edema. 3.  Normal heart size.   This report was finalized on 3/8/2024 6:13 PM by Sj Llamas MD.         Results for orders placed or performed during the hospital  encounter of 03/08/24   Urinalysis With Culture If Indicated - Urine, Clean Catch    Specimen: Urine, Clean Catch   Result Value Ref Range    Color, UA Orange (A) Yellow, Straw    Appearance, UA Cloudy (A) Clear    pH, UA 8.5 (H) 5.0 - 8.0    Specific Gravity, UA 1.009 1.005 - 1.030    Glucose, UA Negative Negative    Ketones, UA Negative Negative    Bilirubin, UA Negative Negative    Blood, UA Large (3+) (A) Negative    Protein,  mg/dL (2+) (A) Negative    Leuk Esterase, UA Moderate (2+) (A) Negative    Nitrite, UA Negative Negative    Urobilinogen, UA 0.2 E.U./dL 0.2 - 1.0 E.U./dL   Comprehensive Metabolic Panel    Specimen: Blood   Result Value Ref Range    Glucose 104 (H) 65 - 99 mg/dL    BUN 10 6 - 20 mg/dL    Creatinine 0.78 0.57 - 1.00 mg/dL    Sodium 141 136 - 145 mmol/L    Potassium 4.2 3.5 - 5.2 mmol/L    Chloride 105 98 - 107 mmol/L    CO2 24.6 22.0 - 29.0 mmol/L    Calcium 8.8 8.6 - 10.5 mg/dL    Total Protein 6.4 6.0 - 8.5 g/dL    Albumin 3.4 (L) 3.5 - 5.2 g/dL    ALT (SGPT) 10 1 - 33 U/L    AST (SGOT) 14 1 - 32 U/L    Alkaline Phosphatase 69 39 - 117 U/L    Total Bilirubin 0.3 0.0 - 1.2 mg/dL    Globulin 3.0 gm/dL    A/G Ratio 1.1 g/dL    BUN/Creatinine Ratio 12.8 7.0 - 25.0    Anion Gap 11.4 5.0 - 15.0 mmol/L    eGFR 103.6 >60.0 mL/min/1.73   CBC Auto Differential    Specimen: Blood   Result Value Ref Range    WBC 9.65 3.40 - 10.80 10*3/mm3    RBC 3.71 (L) 3.77 - 5.28 10*6/mm3    Hemoglobin 10.8 (L) 12.0 - 15.9 g/dL    Hematocrit 33.2 (L) 34.0 - 46.6 %    MCV 89.5 79.0 - 97.0 fL    MCH 29.1 26.6 - 33.0 pg    MCHC 32.5 31.5 - 35.7 g/dL    RDW 14.0 12.3 - 15.4 %    RDW-SD 45.1 37.0 - 54.0 fl    MPV 9.5 6.0 - 12.0 fL    Platelets 270 140 - 450 10*3/mm3    Neutrophil % 79.4 (H) 42.7 - 76.0 %    Lymphocyte % 11.7 (L) 19.6 - 45.3 %    Monocyte % 6.0 5.0 - 12.0 %    Eosinophil % 1.9 0.3 - 6.2 %    Basophil % 0.6 0.0 - 1.5 %    Immature Grans % 0.4 0.0 - 0.5 %    Neutrophils, Absolute 7.66 (H) 1.70 -  7.00 10*3/mm3    Lymphocytes, Absolute 1.13 0.70 - 3.10 10*3/mm3    Monocytes, Absolute 0.58 0.10 - 0.90 10*3/mm3    Eosinophils, Absolute 0.18 0.00 - 0.40 10*3/mm3    Basophils, Absolute 0.06 0.00 - 0.20 10*3/mm3    Immature Grans, Absolute 0.04 0.00 - 0.05 10*3/mm3    nRBC 0.0 0.0 - 0.2 /100 WBC   Urinalysis, Microscopic Only - Urine, Clean Catch    Specimen: Urine, Clean Catch   Result Value Ref Range    RBC, UA Too Numerous to Count (A) None Seen, 0-2 /HPF    WBC, UA Too Numerous to Count (A) None Seen, 0-2 /HPF    Bacteria, UA None Seen None Seen /HPF    Squamous Epithelial Cells, UA 3-6 (A) None Seen, 0-2 /HPF    Hyaline Casts, UA None Seen None Seen /LPF    Methodology Automated Microscopy                                             Medical Decision Making  CBC and CMP are unremarkable.  Urinalysis does have proteinuria.  However, blood and bacteria are present.  Keflex initiated.  Recommended following up with OB/GYN for further evaluation and possible treatment.  Patient does not have obvious signs of postpartum preeclampsia at this time.  Work up and results were discussed throughly with the patient.  The patient will be discharged for further monitoring and management with their PCP.  Red flags, warning signs, worsening symptoms, and when to return to the ER discussed with and understood by the patient.  Patient will follow up with their PCP in a timely manner.  Vitals stable at discharge.    Amount and/or Complexity of Data Reviewed  Labs: ordered. Decision-making details documented in ED Course.  Radiology: ordered. Decision-making details documented in ED Course.    Risk  Prescription drug management.        Final diagnoses:   Acute UTI       ED Disposition  ED Disposition       ED Disposition   Discharge    Condition   Stable    Comment   --               No follow-up provider specified.       Medication List        New Prescriptions      cephalexin 500 MG capsule  Commonly known as: KEFLEX  Take 1  capsule by mouth 2 (Two) Times a Day.               Where to Get Your Medications        These medications were sent to [facility] - [place], [place] - [address] [phone]  - [phone] [address] [address]      Phone: [phone]   cephalexin 500 MG capsule            Jose Luis Martin DO  03/08/24 7702

## 2024-03-08 NOTE — ED NOTES
Patient reports having hypertension and anxiety. Patient states that she used to take atenolol and it helped with both symptoms but her PCP and OBGYN would not write these for her.

## 2024-03-10 LAB — BACTERIA SPEC AEROBE CULT: NO GROWTH

## 2025-05-02 ENCOUNTER — HOSPITAL ENCOUNTER (OUTPATIENT)
Facility: HOSPITAL | Age: 35
Discharge: HOME | End: 2025-05-02
Attending: OBSTETRICS & GYNECOLOGY | Admitting: OBSTETRICS & GYNECOLOGY
Payer: MEDICAID

## 2025-05-02 ENCOUNTER — APPOINTMENT (OUTPATIENT)
Dept: ULTRASOUND IMAGING | Facility: HOSPITAL | Age: 35
End: 2025-05-02
Payer: MEDICAID

## 2025-05-02 VITALS
HEIGHT: 64 IN | BODY MASS INDEX: 45.12 KG/M2 | HEART RATE: 121 BPM | TEMPERATURE: 96.6 F | SYSTOLIC BLOOD PRESSURE: 137 MMHG | RESPIRATION RATE: 20 BRPM | WEIGHT: 264.3 LBS | DIASTOLIC BLOOD PRESSURE: 80 MMHG

## 2025-05-02 PROBLEM — O28.9 ABNORMAL ANTENATAL TEST: Status: ACTIVE | Noted: 2025-05-02

## 2025-05-02 PROCEDURE — 76819 FETAL BIOPHYS PROFIL W/O NST: CPT

## 2025-05-02 PROCEDURE — 76819 FETAL BIOPHYS PROFIL W/O NST: CPT | Performed by: RADIOLOGY

## 2025-05-02 PROCEDURE — G0463 HOSPITAL OUTPT CLINIC VISIT: HCPCS

## 2025-05-02 PROCEDURE — 59025 FETAL NON-STRESS TEST: CPT

## 2025-05-02 NOTE — NON STRESS TEST
Joanna Hernandez, a  at 32w0d with an NICOLE of 2025, by Patient Reported, was seen at [facility] LABOR DELIVERY for a nonstress test.    Chief Complaint   Patient presents with    Non-stress Test     AND BPP D/T NONREACTIVE NST IN OFFICE FOR GDM ON MEDS. DENIES CTX, VB OR LOF. STATES BABY IS MOVING WELL.        Patient Active Problem List   Diagnosis    HTN (hypertension)    Hypertension affecting pregnancy    Gestational diabetes    Postpartum care following  delivery    Delivery of pregnancy by  section    Abnormal  test       Start Time:   Stop Time:     Interpretation A  Nonstress Test Interpretation A: Reactive  Comments A: Verified by CALEB Lopez RN

## 2025-05-03 NOTE — PLAN OF CARE
Goal Outcome Evaluation:  Plan of Care Reviewed With: patient        Progress: improving  Outcome Evaluation: BPP 8/8. Patient denies pain,contractions, or cramping. Positive fetal movement. Discharge education provided.

## 2025-05-26 ENCOUNTER — HOSPITAL ENCOUNTER (OUTPATIENT)
Facility: HOSPITAL | Age: 35
Discharge: HOME | End: 2025-05-26
Attending: OBSTETRICS & GYNECOLOGY | Admitting: OBSTETRICS & GYNECOLOGY
Payer: MEDICAID

## 2025-05-26 VITALS
RESPIRATION RATE: 20 BRPM | DIASTOLIC BLOOD PRESSURE: 71 MMHG | HEIGHT: 64 IN | WEIGHT: 264.4 LBS | SYSTOLIC BLOOD PRESSURE: 143 MMHG | HEART RATE: 97 BPM | BODY MASS INDEX: 45.14 KG/M2 | TEMPERATURE: 97.9 F

## 2025-05-26 PROBLEM — O13.3 GESTATIONAL HYPERTENSION, THIRD TRIMESTER: Status: ACTIVE | Noted: 2025-05-26

## 2025-05-26 PROCEDURE — G0463 HOSPITAL OUTPT CLINIC VISIT: HCPCS

## 2025-05-26 PROCEDURE — 59025 FETAL NON-STRESS TEST: CPT

## 2025-05-26 NOTE — NON STRESS TEST
Joanna Hernandez, a  at 35w3d with an NICOLE of 2025, by Patient Reported, was seen at [facility] LABOR DELIVERY for a nonstress test.    Chief Complaint   Patient presents with    Non-stress Test     FOR GESTATIONAL DIABETES       Patient Active Problem List   Diagnosis    HTN (hypertension)    Hypertension affecting pregnancy    Gestational diabetes    Postpartum care following  delivery    Delivery of pregnancy by  section    Abnormal  test    Gestational hypertension, third trimester       Start Time: 1620   Stop Time: 1640    Interpretation A  Nonstress Test Interpretation A: Reactive  Comments A: VERIFIED BY NEIL TALBERT RN

## 2025-06-09 ENCOUNTER — ANESTHESIA EVENT (OUTPATIENT)
Dept: LABOR AND DELIVERY | Facility: HOSPITAL | Age: 35
End: 2025-06-09
Payer: MEDICAID

## 2025-06-10 ENCOUNTER — HOSPITAL ENCOUNTER (INPATIENT)
Facility: HOSPITAL | Age: 35
LOS: 2 days | Discharge: HOME | End: 2025-06-12
Attending: OBSTETRICS & GYNECOLOGY | Admitting: OBSTETRICS & GYNECOLOGY
Payer: MEDICAID

## 2025-06-10 ENCOUNTER — ANESTHESIA (OUTPATIENT)
Dept: LABOR AND DELIVERY | Facility: HOSPITAL | Age: 35
End: 2025-06-10
Payer: MEDICAID

## 2025-06-10 DIAGNOSIS — Z30.2 ENCOUNTER FOR STERILIZATION: Primary | ICD-10-CM

## 2025-06-10 PROBLEM — O24.419 GESTATIONAL DIABETES MELLITUS (GDM) AFFECTING PREGNANCY: Status: ACTIVE | Noted: 2025-06-10

## 2025-06-10 LAB
ABO GROUP BLD: NORMAL
ALBUMIN SERPL-MCNC: 3.6 G/DL (ref 3.5–5.2)
ALBUMIN/GLOB SERPL: 1.1 G/DL
ALP SERPL-CCNC: 98 U/L (ref 39–117)
ALT SERPL W P-5'-P-CCNC: 29 U/L (ref 1–33)
AMPHET+METHAMPHET UR QL: NEGATIVE
AMPHETAMINES UR QL: NEGATIVE
ANION GAP SERPL CALCULATED.3IONS-SCNC: 11.3 MMOL/L (ref 5–15)
AST SERPL-CCNC: 28 U/L (ref 1–32)
BARBITURATES UR QL SCN: NEGATIVE
BASOPHILS # BLD AUTO: 0.06 10*3/MM3 (ref 0–0.2)
BASOPHILS NFR BLD AUTO: 0.7 % (ref 0–1.5)
BENZODIAZ UR QL SCN: NEGATIVE
BILIRUB SERPL-MCNC: 0.5 MG/DL (ref 0–1.2)
BLD GP AB SCN SERPL QL: NEGATIVE
BUN SERPL-MCNC: 8.7 MG/DL (ref 6–20)
BUN/CREAT SERPL: 15.3 (ref 7–25)
BUPRENORPHINE SERPL-MCNC: NEGATIVE NG/ML
CALCIUM SPEC-SCNC: 8.7 MG/DL (ref 8.6–10.5)
CANNABINOIDS SERPL QL: NEGATIVE
CHLORIDE SERPL-SCNC: 102 MMOL/L (ref 98–107)
CO2 SERPL-SCNC: 19.7 MMOL/L (ref 22–29)
COCAINE UR QL: NEGATIVE
CREAT SERPL-MCNC: 0.57 MG/DL (ref 0.57–1)
DEPRECATED RDW RBC AUTO: 50.9 FL (ref 37–54)
EGFRCR SERPLBLD CKD-EPI 2021: 123.2 ML/MIN/1.73
EOSINOPHIL # BLD AUTO: 0.13 10*3/MM3 (ref 0–0.4)
EOSINOPHIL NFR BLD AUTO: 1.5 % (ref 0.3–6.2)
ERYTHROCYTE [DISTWIDTH] IN BLOOD BY AUTOMATED COUNT: 15.4 % (ref 12.3–15.4)
FENTANYL UR-MCNC: NEGATIVE NG/ML
GLOBULIN UR ELPH-MCNC: 3.2 GM/DL
GLUCOSE BLDC GLUCOMTR-MCNC: 109 MG/DL (ref 70–130)
GLUCOSE SERPL-MCNC: 106 MG/DL (ref 65–99)
HCT VFR BLD AUTO: 35.9 % (ref 34–46.6)
HGB BLD-MCNC: 11.8 G/DL (ref 12–15.9)
IMM GRANULOCYTES # BLD AUTO: 0.03 10*3/MM3 (ref 0–0.05)
IMM GRANULOCYTES NFR BLD AUTO: 0.3 % (ref 0–0.5)
LYMPHOCYTES # BLD AUTO: 1.57 10*3/MM3 (ref 0.7–3.1)
LYMPHOCYTES NFR BLD AUTO: 17.7 % (ref 19.6–45.3)
MCH RBC QN AUTO: 29.7 PG (ref 26.6–33)
MCHC RBC AUTO-ENTMCNC: 32.9 G/DL (ref 31.5–35.7)
MCV RBC AUTO: 90.4 FL (ref 79–97)
METHADONE UR QL SCN: NEGATIVE
MONOCYTES # BLD AUTO: 0.67 10*3/MM3 (ref 0.1–0.9)
MONOCYTES NFR BLD AUTO: 7.5 % (ref 5–12)
NEUTROPHILS NFR BLD AUTO: 6.42 10*3/MM3 (ref 1.7–7)
NEUTROPHILS NFR BLD AUTO: 72.3 % (ref 42.7–76)
NRBC BLD AUTO-RTO: 0 /100 WBC (ref 0–0.2)
OPIATES UR QL: NEGATIVE
OXYCODONE UR QL SCN: NEGATIVE
PCP UR QL SCN: NEGATIVE
PLATELET # BLD AUTO: 246 10*3/MM3 (ref 140–450)
PMV BLD AUTO: 10.9 FL (ref 6–12)
POTASSIUM SERPL-SCNC: 3.7 MMOL/L (ref 3.5–5.2)
PROT SERPL-MCNC: 6.8 G/DL (ref 6–8.5)
RBC # BLD AUTO: 3.97 10*6/MM3 (ref 3.77–5.28)
RH BLD: POSITIVE
SODIUM SERPL-SCNC: 133 MMOL/L (ref 136–145)
T&S EXPIRATION DATE: NORMAL
TREPONEMA PALLIDUM IGG+IGM AB [PRESENCE] IN SERUM OR PLASMA BY IMMUNOASSAY: NORMAL
TRICYCLICS UR QL SCN: NEGATIVE
WBC NRBC COR # BLD AUTO: 8.88 10*3/MM3 (ref 3.4–10.8)

## 2025-06-10 PROCEDURE — 86900 BLOOD TYPING SEROLOGIC ABO: CPT | Performed by: OBSTETRICS & GYNECOLOGY

## 2025-06-10 PROCEDURE — 25010000002 FENTANYL CITRATE (PF) 50 MCG/ML SOLUTION: Performed by: NURSE ANESTHETIST, CERTIFIED REGISTERED

## 2025-06-10 PROCEDURE — 80307 DRUG TEST PRSMV CHEM ANLYZR: CPT | Performed by: OBSTETRICS & GYNECOLOGY

## 2025-06-10 PROCEDURE — 25010000002 BUPIVACAINE PF 0.75 % SOLUTION: Performed by: NURSE ANESTHETIST, CERTIFIED REGISTERED

## 2025-06-10 PROCEDURE — 86780 TREPONEMA PALLIDUM: CPT | Performed by: OBSTETRICS & GYNECOLOGY

## 2025-06-10 PROCEDURE — 0UB70ZZ EXCISION OF BILATERAL FALLOPIAN TUBES, OPEN APPROACH: ICD-10-PCS | Performed by: OBSTETRICS & GYNECOLOGY

## 2025-06-10 PROCEDURE — 86850 RBC ANTIBODY SCREEN: CPT | Performed by: OBSTETRICS & GYNECOLOGY

## 2025-06-10 PROCEDURE — 25810000003 LACTATED RINGERS PER 1000 ML: Performed by: NURSE ANESTHETIST, CERTIFIED REGISTERED

## 2025-06-10 PROCEDURE — 25010000002 ONDANSETRON PER 1 MG: Performed by: NURSE ANESTHETIST, CERTIFIED REGISTERED

## 2025-06-10 PROCEDURE — 25010000002 MORPHINE PER 10 MG: Performed by: NURSE ANESTHETIST, CERTIFIED REGISTERED

## 2025-06-10 PROCEDURE — 80053 COMPREHEN METABOLIC PANEL: CPT | Performed by: OBSTETRICS & GYNECOLOGY

## 2025-06-10 PROCEDURE — 25010000002 KETOROLAC TROMETHAMINE PER 15 MG: Performed by: OBSTETRICS & GYNECOLOGY

## 2025-06-10 PROCEDURE — 82948 REAGENT STRIP/BLOOD GLUCOSE: CPT

## 2025-06-10 PROCEDURE — 25810000003 LACTATED RINGERS SOLUTION: Performed by: OBSTETRICS & GYNECOLOGY

## 2025-06-10 PROCEDURE — 25010000002 CEFAZOLIN PER 500 MG: Performed by: OBSTETRICS & GYNECOLOGY

## 2025-06-10 PROCEDURE — 85025 COMPLETE CBC W/AUTO DIFF WBC: CPT | Performed by: OBSTETRICS & GYNECOLOGY

## 2025-06-10 PROCEDURE — 86901 BLOOD TYPING SEROLOGIC RH(D): CPT | Performed by: OBSTETRICS & GYNECOLOGY

## 2025-06-10 DEVICE — CLIP FALLOP FILSHIE TI PR STRL BX/10: Type: IMPLANTABLE DEVICE | Site: FALLOPIAN TUBE | Status: FUNCTIONAL

## 2025-06-10 RX ORDER — SODIUM CHLORIDE, SODIUM LACTATE, POTASSIUM CHLORIDE, CALCIUM CHLORIDE 600; 310; 30; 20 MG/100ML; MG/100ML; MG/100ML; MG/100ML
INJECTION, SOLUTION INTRAVENOUS CONTINUOUS PRN
Status: DISCONTINUED | OUTPATIENT
Start: 2025-06-10 | End: 2025-06-10 | Stop reason: SURG

## 2025-06-10 RX ORDER — HYDROCORTISONE 25 MG/G
CREAM TOPICAL 3 TIMES DAILY PRN
Status: DISCONTINUED | OUTPATIENT
Start: 2025-06-10 | End: 2025-06-12 | Stop reason: HOSPADM

## 2025-06-10 RX ORDER — ENOXAPARIN SODIUM 100 MG/ML
40 INJECTION SUBCUTANEOUS EVERY 12 HOURS
Status: DISCONTINUED | OUTPATIENT
Start: 2025-06-11 | End: 2025-06-12 | Stop reason: HOSPADM

## 2025-06-10 RX ORDER — DIPHENHYDRAMINE HYDROCHLORIDE 50 MG/ML
25 INJECTION, SOLUTION INTRAMUSCULAR; INTRAVENOUS EVERY 4 HOURS PRN
Status: DISCONTINUED | OUTPATIENT
Start: 2025-06-10 | End: 2025-06-12 | Stop reason: HOSPADM

## 2025-06-10 RX ORDER — MORPHINE SULFATE 0.5 MG/ML
INJECTION, SOLUTION EPIDURAL; INTRATHECAL; INTRAVENOUS AS NEEDED
Status: DISCONTINUED | OUTPATIENT
Start: 2025-06-10 | End: 2025-06-10 | Stop reason: SURG

## 2025-06-10 RX ORDER — ONDANSETRON 2 MG/ML
4 INJECTION INTRAMUSCULAR; INTRAVENOUS EVERY 6 HOURS PRN
Status: DISCONTINUED | OUTPATIENT
Start: 2025-06-10 | End: 2025-06-12 | Stop reason: HOSPADM

## 2025-06-10 RX ORDER — SIMETHICONE 80 MG
80 TABLET,CHEWABLE ORAL 4 TIMES DAILY PRN
Status: DISCONTINUED | OUTPATIENT
Start: 2025-06-10 | End: 2025-06-12 | Stop reason: HOSPADM

## 2025-06-10 RX ORDER — IBUPROFEN 600 MG/1
600 TABLET, FILM COATED ORAL EVERY 6 HOURS
Status: DISCONTINUED | OUTPATIENT
Start: 2025-06-11 | End: 2025-06-12 | Stop reason: HOSPADM

## 2025-06-10 RX ORDER — FENTANYL CITRATE 50 UG/ML
INJECTION, SOLUTION INTRAMUSCULAR; INTRAVENOUS AS NEEDED
Status: DISCONTINUED | OUTPATIENT
Start: 2025-06-10 | End: 2025-06-10 | Stop reason: SURG

## 2025-06-10 RX ORDER — PRENATAL VIT/IRON FUM/FOLIC AC 27MG-0.8MG
1 TABLET ORAL NIGHTLY
Status: DISCONTINUED | OUTPATIENT
Start: 2025-06-10 | End: 2025-06-12 | Stop reason: HOSPADM

## 2025-06-10 RX ORDER — SODIUM CHLORIDE 0.9 % (FLUSH) 0.9 %
10 SYRINGE (ML) INJECTION AS NEEDED
Status: DISCONTINUED | OUTPATIENT
Start: 2025-06-10 | End: 2025-06-10 | Stop reason: HOSPADM

## 2025-06-10 RX ORDER — HYDROXYZINE HYDROCHLORIDE 25 MG/1
50 TABLET, FILM COATED ORAL EVERY 6 HOURS PRN
Status: DISCONTINUED | OUTPATIENT
Start: 2025-06-10 | End: 2025-06-12 | Stop reason: HOSPADM

## 2025-06-10 RX ORDER — PROMETHAZINE HYDROCHLORIDE 25 MG/1
12.5 TABLET ORAL EVERY 6 HOURS PRN
Status: DISCONTINUED | OUTPATIENT
Start: 2025-06-10 | End: 2025-06-10 | Stop reason: HOSPADM

## 2025-06-10 RX ORDER — ONDANSETRON 2 MG/ML
INJECTION INTRAMUSCULAR; INTRAVENOUS AS NEEDED
Status: DISCONTINUED | OUTPATIENT
Start: 2025-06-10 | End: 2025-06-10 | Stop reason: SURG

## 2025-06-10 RX ORDER — ACETAMINOPHEN 500 MG
1000 TABLET ORAL ONCE
Status: COMPLETED | OUTPATIENT
Start: 2025-06-10 | End: 2025-06-10

## 2025-06-10 RX ORDER — MAGNESIUM HYDROXIDE 1200 MG/15ML
3000 LIQUID ORAL ONCE AS NEEDED
Status: DISCONTINUED | OUTPATIENT
Start: 2025-06-10 | End: 2025-06-10 | Stop reason: HOSPADM

## 2025-06-10 RX ORDER — ONDANSETRON 4 MG/1
4 TABLET, ORALLY DISINTEGRATING ORAL EVERY 6 HOURS PRN
Status: DISCONTINUED | OUTPATIENT
Start: 2025-06-10 | End: 2025-06-12 | Stop reason: HOSPADM

## 2025-06-10 RX ORDER — POLYETHYLENE GLYCOL 3350 17 G/17G
17 POWDER, FOR SOLUTION ORAL DAILY
Status: DISCONTINUED | OUTPATIENT
Start: 2025-06-10 | End: 2025-06-12 | Stop reason: HOSPADM

## 2025-06-10 RX ORDER — METHYLERGONOVINE MALEATE 0.2 MG/ML
200 INJECTION INTRAVENOUS AS NEEDED
Status: DISCONTINUED | OUTPATIENT
Start: 2025-06-10 | End: 2025-06-10 | Stop reason: HOSPADM

## 2025-06-10 RX ORDER — ACETAMINOPHEN 500 MG
1000 TABLET ORAL EVERY 6 HOURS
Status: COMPLETED | OUTPATIENT
Start: 2025-06-10 | End: 2025-06-11

## 2025-06-10 RX ORDER — OXYCODONE HYDROCHLORIDE 5 MG/1
5 TABLET ORAL EVERY 4 HOURS PRN
Status: DISCONTINUED | OUTPATIENT
Start: 2025-06-10 | End: 2025-06-12 | Stop reason: HOSPADM

## 2025-06-10 RX ORDER — KETOROLAC TROMETHAMINE 30 MG/ML
15 INJECTION, SOLUTION INTRAMUSCULAR; INTRAVENOUS EVERY 6 HOURS
Status: COMPLETED | OUTPATIENT
Start: 2025-06-10 | End: 2025-06-11

## 2025-06-10 RX ORDER — HYDROXYZINE HYDROCHLORIDE 25 MG/1
50 TABLET, FILM COATED ORAL NIGHTLY PRN
Status: DISCONTINUED | OUTPATIENT
Start: 2025-06-10 | End: 2025-06-10 | Stop reason: HOSPADM

## 2025-06-10 RX ORDER — ONDANSETRON 2 MG/ML
4 INJECTION INTRAMUSCULAR; INTRAVENOUS ONCE AS NEEDED
Status: ACTIVE | OUTPATIENT
Start: 2025-06-10 | End: 2025-06-11

## 2025-06-10 RX ORDER — OXYTOCIN/0.9 % SODIUM CHLORIDE 30/500 ML
125 PLASTIC BAG, INJECTION (ML) INTRAVENOUS ONCE AS NEEDED
Status: DISCONTINUED | OUTPATIENT
Start: 2025-06-10 | End: 2025-06-12 | Stop reason: HOSPADM

## 2025-06-10 RX ORDER — OXYTOCIN/0.9 % SODIUM CHLORIDE 30/500 ML
999 PLASTIC BAG, INJECTION (ML) INTRAVENOUS ONCE
Status: COMPLETED | OUTPATIENT
Start: 2025-06-10 | End: 2025-06-10

## 2025-06-10 RX ORDER — ONDANSETRON 2 MG/ML
4 INJECTION INTRAMUSCULAR; INTRAVENOUS EVERY 6 HOURS PRN
Status: DISCONTINUED | OUTPATIENT
Start: 2025-06-10 | End: 2025-06-10 | Stop reason: HOSPADM

## 2025-06-10 RX ORDER — EPHEDRINE SULFATE/0.9% NACL/PF 25 MG/5 ML
SYRINGE (ML) INTRAVENOUS AS NEEDED
Status: DISCONTINUED | OUTPATIENT
Start: 2025-06-10 | End: 2025-06-10 | Stop reason: SURG

## 2025-06-10 RX ORDER — METFORMIN HYDROCHLORIDE 500 MG/1
500 TABLET, EXTENDED RELEASE ORAL 2 TIMES DAILY
COMMUNITY
End: 2025-06-12

## 2025-06-10 RX ORDER — MISOPROSTOL 100 UG/1
600 TABLET ORAL AS NEEDED
Status: DISCONTINUED | OUTPATIENT
Start: 2025-06-10 | End: 2025-06-10 | Stop reason: HOSPADM

## 2025-06-10 RX ORDER — MORPHINE SULFATE 2 MG/ML
2 INJECTION, SOLUTION INTRAMUSCULAR; INTRAVENOUS
Status: ACTIVE | OUTPATIENT
Start: 2025-06-10 | End: 2025-06-11

## 2025-06-10 RX ORDER — DIPHENHYDRAMINE HCL 25 MG
25 CAPSULE ORAL EVERY 4 HOURS PRN
Status: DISCONTINUED | OUTPATIENT
Start: 2025-06-10 | End: 2025-06-12 | Stop reason: HOSPADM

## 2025-06-10 RX ORDER — MAGNESIUM HYDROXIDE 1200 MG/15ML
LIQUID ORAL AS NEEDED
Status: DISCONTINUED | OUTPATIENT
Start: 2025-06-10 | End: 2025-06-12 | Stop reason: HOSPADM

## 2025-06-10 RX ORDER — OXYTOCIN/0.9 % SODIUM CHLORIDE 30/500 ML
250 PLASTIC BAG, INJECTION (ML) INTRAVENOUS CONTINUOUS
Status: DISPENSED | OUTPATIENT
Start: 2025-06-10 | End: 2025-06-10

## 2025-06-10 RX ORDER — METOCLOPRAMIDE 10 MG/1
10 TABLET ORAL ONCE
Status: COMPLETED | OUTPATIENT
Start: 2025-06-10 | End: 2025-06-10

## 2025-06-10 RX ORDER — OXYCODONE HYDROCHLORIDE 10 MG/1
10 TABLET ORAL EVERY 4 HOURS PRN
Status: DISCONTINUED | OUTPATIENT
Start: 2025-06-10 | End: 2025-06-12 | Stop reason: HOSPADM

## 2025-06-10 RX ORDER — BUPIVACAINE HYDROCHLORIDE 7.5 MG/ML
INJECTION, SOLUTION EPIDURAL; RETROBULBAR AS NEEDED
Status: DISCONTINUED | OUTPATIENT
Start: 2025-06-10 | End: 2025-06-10 | Stop reason: SURG

## 2025-06-10 RX ORDER — PHENYLEPHRINE HCL IN 0.9% NACL 1 MG/10 ML
SYRINGE (ML) INTRAVENOUS AS NEEDED
Status: DISCONTINUED | OUTPATIENT
Start: 2025-06-10 | End: 2025-06-10 | Stop reason: SURG

## 2025-06-10 RX ORDER — CARBOPROST TROMETHAMINE 250 UG/ML
250 INJECTION, SOLUTION INTRAMUSCULAR AS NEEDED
Status: DISCONTINUED | OUTPATIENT
Start: 2025-06-10 | End: 2025-06-10 | Stop reason: HOSPADM

## 2025-06-10 RX ORDER — ACETAMINOPHEN 325 MG/1
650 TABLET ORAL EVERY 6 HOURS
Status: DISCONTINUED | OUTPATIENT
Start: 2025-06-11 | End: 2025-06-12 | Stop reason: HOSPADM

## 2025-06-10 RX ORDER — ONDANSETRON 4 MG/1
4 TABLET, ORALLY DISINTEGRATING ORAL EVERY 6 HOURS PRN
Status: DISCONTINUED | OUTPATIENT
Start: 2025-06-10 | End: 2025-06-10 | Stop reason: HOSPADM

## 2025-06-10 RX ORDER — TRANEXAMIC ACID 10 MG/ML
1000 INJECTION, SOLUTION INTRAVENOUS ONCE AS NEEDED
Status: DISCONTINUED | OUTPATIENT
Start: 2025-06-10 | End: 2025-06-10 | Stop reason: HOSPADM

## 2025-06-10 RX ORDER — KETOROLAC TROMETHAMINE 30 MG/ML
30 INJECTION, SOLUTION INTRAMUSCULAR; INTRAVENOUS ONCE
Status: COMPLETED | OUTPATIENT
Start: 2025-06-10 | End: 2025-06-10

## 2025-06-10 RX ORDER — PRENATAL VIT/IRON FUM/FOLIC AC 27MG-0.8MG
1 TABLET ORAL NIGHTLY
Status: CANCELLED | OUTPATIENT
Start: 2025-06-10

## 2025-06-10 RX ORDER — SODIUM CHLORIDE, SODIUM LACTATE, POTASSIUM CHLORIDE, CALCIUM CHLORIDE 600; 310; 30; 20 MG/100ML; MG/100ML; MG/100ML; MG/100ML
125 INJECTION, SOLUTION INTRAVENOUS CONTINUOUS
Status: DISCONTINUED | OUTPATIENT
Start: 2025-06-10 | End: 2025-06-10

## 2025-06-10 RX ORDER — PROMETHAZINE HYDROCHLORIDE 12.5 MG/1
12.5 SUPPOSITORY RECTAL EVERY 6 HOURS PRN
Status: DISCONTINUED | OUTPATIENT
Start: 2025-06-10 | End: 2025-06-10 | Stop reason: HOSPADM

## 2025-06-10 RX ORDER — PROMETHAZINE HYDROCHLORIDE 25 MG/1
12.5 TABLET ORAL EVERY 4 HOURS PRN
Status: DISCONTINUED | OUTPATIENT
Start: 2025-06-10 | End: 2025-06-12 | Stop reason: HOSPADM

## 2025-06-10 RX ADMIN — Medication 999 ML/HR: at 10:48

## 2025-06-10 RX ADMIN — MORPHINE SULFATE 0.25 MG: 0.5 INJECTION, SOLUTION EPIDURAL; INTRATHECAL; INTRAVENOUS at 10:20

## 2025-06-10 RX ADMIN — KETOROLAC TROMETHAMINE 30 MG: 30 INJECTION, SOLUTION INTRAMUSCULAR; INTRAVENOUS at 12:01

## 2025-06-10 RX ADMIN — FENTANYL CITRATE 25 MCG: 50 INJECTION INTRAMUSCULAR; INTRAVENOUS at 10:20

## 2025-06-10 RX ADMIN — BUPIVACAINE HYDROCHLORIDE 1.8 ML: 7.5 INJECTION, SOLUTION EPIDURAL; INTRACAUDAL; PERINEURAL at 10:20

## 2025-06-10 RX ADMIN — Medication 1 TABLET: at 21:36

## 2025-06-10 RX ADMIN — EPHEDRINE SULFATE 5 MG: 5 INJECTION INTRAVENOUS at 10:21

## 2025-06-10 RX ADMIN — Medication 999 ML/HR: at 11:10

## 2025-06-10 RX ADMIN — SODIUM CHLORIDE, POTASSIUM CHLORIDE, SODIUM LACTATE AND CALCIUM CHLORIDE: 600; 310; 30; 20 INJECTION, SOLUTION INTRAVENOUS at 10:15

## 2025-06-10 RX ADMIN — Medication 100 MCG: at 10:29

## 2025-06-10 RX ADMIN — MORPHINE SULFATE 4.75 MG: 0.5 INJECTION, SOLUTION EPIDURAL; INTRATHECAL; INTRAVENOUS at 11:00

## 2025-06-10 RX ADMIN — FENTANYL CITRATE 75 MCG: 50 INJECTION, SOLUTION INTRAMUSCULAR; INTRAVENOUS at 11:00

## 2025-06-10 RX ADMIN — KETOROLAC TROMETHAMINE 15 MG: 30 INJECTION, SOLUTION INTRAMUSCULAR; INTRAVENOUS at 19:51

## 2025-06-10 RX ADMIN — CEFAZOLIN 2 G: 2 INJECTION, POWDER, FOR SOLUTION INTRAMUSCULAR; INTRAVENOUS at 10:22

## 2025-06-10 RX ADMIN — SODIUM CHLORIDE, POTASSIUM CHLORIDE, SODIUM LACTATE AND CALCIUM CHLORIDE 1000 ML: 600; 310; 30; 20 INJECTION, SOLUTION INTRAVENOUS at 07:37

## 2025-06-10 RX ADMIN — ACETAMINOPHEN 1000 MG: 500 TABLET ORAL at 15:41

## 2025-06-10 RX ADMIN — SODIUM CHLORIDE, POTASSIUM CHLORIDE, SODIUM LACTATE AND CALCIUM CHLORIDE: 600; 310; 30; 20 INJECTION, SOLUTION INTRAVENOUS at 10:32

## 2025-06-10 RX ADMIN — ACETAMINOPHEN 1000 MG: 500 TABLET ORAL at 08:18

## 2025-06-10 RX ADMIN — METOCLOPRAMIDE 10 MG: 10 TABLET ORAL at 15:41

## 2025-06-10 RX ADMIN — ACETAMINOPHEN 1000 MG: 500 TABLET ORAL at 21:37

## 2025-06-10 RX ADMIN — ONDANSETRON 8 MG: 2 INJECTION INTRAMUSCULAR; INTRAVENOUS at 10:15

## 2025-06-10 NOTE — ANESTHESIA POSTPROCEDURE EVALUATION
Patient: Joanna Hernandez    Procedure Summary       Date: 06/10/25 Room / Location:  [facility] LABOR DELIVERY   [facility] LABOR DELIVERY    Anesthesia Start:  Anesthesia Stop:     Procedure:  SECTION REPEAT WITH TUBAL (Bilateral: Abdomen) Diagnosis:       (Repeat  Section with tubal)      (Gestational Diabetes mellitus)    Surgeons: Angelique Menendez DO Provider: Cleveland Benjamin DO    Anesthesia Type: spinal, ITN ASA Status: 3            Anesthesia Type: spinal, ITN    Vitals  Vitals Value Taken Time   /99 06/10/25 12:05   Temp 98.8 °F (37.1 °C) 06/10/25 11:35   Pulse 94 06/10/25 12:11   Resp 16 06/10/25 11:35   SpO2 92 % 06/10/25 12:11   Vitals shown include unfiled device data.        Post Anesthesia Care and Evaluation    Patient location during evaluation: PACU  Patient participation: complete - patient participated  Level of consciousness: awake and alert  Pain score: 0  Pain management: adequate    Airway patency: patent  Anesthetic complications: No anesthetic complications  PONV Status: controlled  Cardiovascular status: acceptable  Respiratory status: acceptable and room air  Hydration status: euvolemic  Post Neuraxial Block status: No signs or symptoms of PDPHNo anesthesia care post op

## 2025-06-10 NOTE — LACTATION NOTE
06/10/25 1330   Maternal Information   Person Making Referral lactation consultant  (Courtesy visit, newly postpartum)   Maternal Reason for Referral breastfeeding currently   Maternal Infant Feeding   Maternal Emotional State receptive;independent;relaxed   Infant Positioning cradle   Signs of Milk Transfer audible swallow   Pain with Feeding no   Latch Assistance none needed;verbal guidance offered   Support Person Involvement verbally supports mother;actively supporting mother   Milk Expression/Equipment   Equipment for Home Use breast pump ordered through insurance     Completed breastfeeding education encouraging pt to achieve a deep, comfortable latch throughout breastfeeding which should be at least every 3 hours while giving baby stimulation for high quality transfer of breastmilk. Alternatively, pumping encouraged every three hours, or at baby's feeding times for optimal milk initiation/ production.  Educational handout about breastfeeding and milk storage, a breastfeeding video QR code given and encouraged to watch, and my card given if any questions or help needed. Instructed that if mom needed any help with feedings or any questions please call or let nurse know. All questions answered at this time; PRN Lactation Consultant contact encouraged.

## 2025-06-10 NOTE — OP NOTE
Repeat Low Transverse  Section with Bilateral Tubal Ligation Operation Note    Joanna Hernandez  6/10/2025  Gestational Age: 37w4d    Pre-op Diagnosis:   Previous  Section  Desires Permanent Sterilization  Gestational Diabetes mellitus    Post-op Diagnosis:     RENA    Procedure(s):   SECTION REPEAT WITH TUBAL     Surgeon(s):  Angelique Menendez DO    Anesthesia: Spinal    Staff:   Circulator: Moraima Hermosillo RN  Baby Nurse: Minerva Gusman RN  Assistant: Russell Ramos  OB TECH: Geni Landry    Estimated Blood Loss: 850 cc    Urine Output:: 95 mL    IVF: Intravenous fluids were administered, lactated Ringer's      Grafts/Implants: NA    Procedure     The patient was placed in the dorsal supine position.  She was then prepped and draped in the normal sterile fashion.  Spinal anesthesia was found to be adequate.  The old scar was excised and a pfannenstiel skin incision was made with the scalpel and carried down to the underlying layer of fascia.  The fascia was then incised and extended bilaterally with the obrien scissors.  The superior aspect of this incision was grasped with kocher clamps x 2, tented upward and dissected off with obrien scissors.  The clamps were removed and placed on the inferior aspect of the incision, tented upward and dissected off the rectus muscle to the pubic simphysis.  The rectus muscle was then  in the midline.  The peritoneum was identified, grasped with hemostats x 2 and entered sharply with Metzenbaum scissors.  This incision was then extended in a superiorly and inferiorly.  The bladder blade was then inserted.  The vesicouterine peritoneum was grasped and entered sharply with Metzenbaum scissors and extended bilaterally.  The bladder blade was re-inserted.  A transverse uterine incision was then made with the scalpel and extended in a cranio-caudal  Blunt fashion.  Bladder blade was removed. The infants head was then grasped and brought  through the hysterotomy.  The mouth and nares was bulb suctioned.  The shoulders and body were then delivered atraumatically.  The cord was then doubly clamped and cut and the infant was handed off to the nursing staff with vigorous cry and movement of all extremities.  Cord blood and gas were obtained.  The placenta was manually extracted.  Nichelle retractor was inserted according to protocol.  The uterus was exteriorized and cleared of all clot and debris.  The hysterotomy was grasped with Allis clamps at the edge and repaired with 1-0 Monocryl in a running and locked and single layer fashion.  The posterior cul-de-sac was irrigated and suctioned.  Attention was then turned to the tubal portion of this procedure.  The right tube was grasped Filshie clip applicator and a filshie clip was placed across the entire tube.  A second clip was placed on the same tube. The same procedure was performed on the contralateral side. The uterine incision  was hemostatic and the uterus was returned to the abdomen.  The gutters were cleared of all clot and debris.  Second look at the uterine incision was hemostatic. Ruddy retractor was removed.  The rectus muscle was visualized and hemostatic and reapproximated with 1-0 Monocryl.  Fascia was then grasped with kocher clamps and re-approximated with 0 double looped PDC in a single layer and running fashion.  The subcutaneous layer was irrigated and hemostatic with Bovie cautery.  This layer was then re-approximated with 2-0 plain gut stitch.  The skin was re-approximated with 3-0 vicryl on a kavita needle.  Sterile dressing was applied to the incision.  Uterus was found to be firm and at the umbilicus.  Sponge, lap and needle counts were correct. Prevena dressing was applied.  Pt taken to recovery in stable condition.      APGARS  8   9            GENDER  Male    Complications: None    Angelique Menendez DO     Date: 6/10/2025  Time: 11:26 EDT

## 2025-06-10 NOTE — ANESTHESIA PROCEDURE NOTES
Spinal Block      Performed By  CRNA/CAA: Prachi Macdonald CRNA  Preanesthetic Checklist  Completed: patient identified, IV checked, site marked, risks and benefits discussed, surgical consent, monitors and equipment checked, pre-op evaluation and timeout performed  Spinal Block Prep:  Patient Position:sitting  Sterile Tech:cap, gloves and sterile barriers  Prep:Betadine  Patient Monitoring:EKG, continuous pulse oximetry and blood pressure monitoring    Spinal Block Procedure  Approach:midline  Guidance:landmark technique and palpation technique  Location:L3-L4  Needle Type:Pencan  Needle Gauge:25 G  Placement of Spinal needle event:cerebrospinal fluid aspirated  Paresthesia: no  Fluid Appearance:clear     Post Assessment  Patient Tolerance:patient tolerated the procedure well with no apparent complications  Complications no

## 2025-06-10 NOTE — ANESTHESIA PREPROCEDURE EVALUATION
Anesthesia Evaluation     Patient summary reviewed and Nursing notes reviewed   no history of anesthetic complications:   NPO Solid Status: > 8 hours  NPO Liquid Status: > 8 hours           Airway   Mallampati: III  TM distance: >3 FB  Neck ROM: full  Large neck circumference and Possible difficult intubation  Dental - normal exam     Pulmonary - negative pulmonary ROS and normal exam    breath sounds clear to auscultation  Cardiovascular - normal exam    Rhythm: regular  Rate: normal    (+) hypertension      Neuro/Psych  (+) psychiatric history  GI/Hepatic/Renal/Endo    (+) obesity, morbid obesity (class III morbid obesity), diabetes mellitus gestational    Musculoskeletal (-) negative ROS    Abdominal   (+) obese   Substance History - negative use     OB/GYN    (+) Pregnant (IUP 37 4/7 weeks gest)        Other - negative ROS                   Anesthesia Plan    ASA 3     spinal and ITN     intravenous induction     Anesthetic plan, risks, benefits, and alternatives have been provided, discussed and informed consent has been obtained with: patient.  Pre-procedure education provided  Plan discussed with CRNA.    CODE STATUS:    Code Status (Patient has no pulse and is not breathing): CPR (Attempt to Resuscitate)  Medical Interventions (Patient has pulse or is breathing): Full Support  Level Of Support Discussed With: Patient

## 2025-06-10 NOTE — H&P
"CHANEL Joya  Obstetric History and Physical    Chief Complaint   Patient presents with    Scheduled        Subjective     Patient is a 34 y.o. female  currently at 37w4d, who presents with scheduled repeat C/S and BTL.  Pregnancy is complicated by GDM on metformin.      Her prenatal care is complicated by  diabetes  GDM A2.  Her previous obstetric/gynecological history is noted for is non-contributory.    The following portions of the patients history were reviewed and updated as appropriate: current medications, allergies, past medical history, past surgical history, past family history, past social history, and problem list .       Prenatal Information:   Maternal Prenatal Labs  Blood Type No results found for: \"ABO\"   Rh Status No results found for: \"RH\"   Antibody Screen No results found for: \"ABSCRN\"   Rapid Urine Drug Screen No results found for: \"AMPMETHU\", \"BARBITSCNUR\", \"LABBENZSCN\", \"LABMETHSCN\", \"LABOPIASCN\", \"THCURSCR\", \"COCAINEUR\", \"AMPHETSCREEN\", \"PROPOXSCN\", \"BUPRENORSCNU\", \"METAMPSCNUR\", \"OXYCODONESCN\", \"TRICYCLICSCN\"   Group B Strep Culture No results found for: \"GBSANTIGEN\"           External Prenatal Results       Pregnancy Outside Results - Transcribed From Office Records - See Scanned Records For Details       Test Value Date Time    ABO  A  24 1656    Rh  Positive  24 1656    Antibody Screen ^ Negative  24 1139    Varicella IgG       Rubella       Hgb  11.8 g/dL 06/10/25 0636    Hct  35.9 % 06/10/25 0636    HgB A1c        1h GTT       3h GTT Fasting       3h GTT 1 hour       3h GTT 2 hour       3h GTT 3 hour        Gonorrhea (discrete) ^ Negative  25 1431      ^ Negative  10/29/24 1600    Chlamydia (discrete) ^ Negative  25 1431      ^ Negative  10/29/24 1600    RPR ^ Non Reactive  25 1033      ^ Non Reactive  24 1139    Syphils cascade: TP-Ab (FTA)       TP-Ab       TP-Ab (EIA)       TPPA       HBsAg ^ Negative  24 1139    Herpes " Simplex Virus PCR       Herpes Simplex VIrus Culture       HIV       Hep C RNA Quant PCR       Hep C Antibody       AFP       NIPT       Cystic Fibrosis (Chyna)       Cystic Fibroisis        Spinal Muscular atrophy       Fragile X       Group B Strep       GBS Susceptibility to Clindamycin       GBS Susceptibility to Erythromycin       Fetal Fibronectin       Genetic Testing, Maternal Blood                 Drug Screening       Test Value Date Time    Urine Drug Screen       Amphetamine Screen       Barbiturate Screen       Benzodiazepine Screen       Methadone Screen       Phencyclidine Screen       Opiates Screen       THC Screen       Cocaine Screen       Propoxyphene Screen       Buprenorphine Screen       Methamphetamine Screen       Oxycodone Screen       Tricyclic Antidepressants Screen                 Legend    ^: Historical                              Past OB History:     OB History    Para Term  AB Living   3 1 0 1 1 1   SAB IAB Ectopic Molar Multiple Live Births   1 0 0 0 0 1      # Outcome Date GA Lbr Lul/2nd Weight Sex Type Anes PTL Lv   3 Current            2  24 36w5d  3715 g (8 lb 3 oz) M CS-LTranv Spinal N CHRISSIE      Name: Dimitri Hernandez      Apgar1: 8  Apgar5: 9   1 SAB 2022 6w4d              Past Medical History: Past Medical History:   Diagnosis Date    Anxiety 2021    Gestational diabetes     HTN (hypertension)       Past Surgical History Past Surgical History:   Procedure Laterality Date     SECTION Bilateral 2024    Procedure:  SECTION PRIMARY;  Surgeon: Angelique Menendez DO;  Location:  [facility] LABOR DELIVERY;  Service: Obstetrics/Gynecology;  Laterality: Bilateral;    TONSILLECTOMY        Family History: Family History   Problem Relation Age of Onset    Hypertension Mother     Hypertension Father       Social History:  reports that she has never smoked. She has never used smokeless tobacco.   reports no history of alcohol use.   reports no  history of drug use.        Review of Systems                  Review of Systems   Pertinent items are noted in HPI, all other systems reviewed and negative      Objective     Vital Signs Range for the last 24 hours  Temperature: Temp:  [98 °F (36.7 °C)] 98 °F (36.7 °C)   Temp Source: Temp src: Oral   BP: BP: (151)/(86) 151/86   Pulse: Heart Rate:  [105] 105   Respirations: Resp:  [18] 18   Weight: Weight:  [120 kg (265 lb 1.6 oz)] 120 kg (265 lb 1.6 oz)     Physical Examination: General appearance - alert, well appearing, and in no distress  Chest - clear to auscultation, no wheezes, rales or rhonchi, symmetric air entry  Heart - normal rate, regular rhythm, normal S1, S2, no murmurs, rubs, clicks or gallops  Abdomen - soft, nontender, nondistended, no masses or organomegaly  Extremities - peripheral pulses normal, no pedal edema, no clubbing or cyanosis          Assessment & Plan       Gestational diabetes mellitus (GDM) affecting pregnancy      Assessment & Plan    Assessment/Plan:  1.  Intrauterine pregnancy at 37w4d weeks gestation with reactive fetal status.    2.  GDM- stable this am.  Plan to proceed with delivery.    3. Previous C/S for repeat with BTL at pt request.  Risk and benefit reviewed.  She admits to understanding all of the above and desires to proceed.       Angelique Menendez DO  6/10/2025  07:20 EDT

## 2025-06-10 NOTE — PLAN OF CARE
Problem: Adult Inpatient Plan of Care  Goal: Plan of Care Review  Outcome: Progressing  Goal: Patient-Specific Goal (Individualized)  Outcome: Progressing  Goal: Absence of Hospital-Acquired Illness or Injury  Outcome: Progressing  Intervention: Identify and Manage Fall Risk  Description: Perform standard risk assessment on admission using a validated tool or comprehensive approach appropriate to the patient; reassess fall risk frequently, with change in status or transfer to another level of care.Communicate risk to interprofessional healthcare team; ensure fall risk visible cue.Determine need for increased observation, equipment and environmental modification, as well as use of supportive, nonskid footwear.Adjust safety measures to individual needs and identified risk factors.Reinforce the importance of active participation with fall risk prevention, safety, and physical activity with the patient and family.Perform regular intentional rounding to assess need for position change, pain assessment and personal needs, including assistance with toileting.  Recent Flowsheet Documentation  Taken 6/10/2025 1305 by Sophia Newby RN  Safety Promotion/Fall Prevention: safety round/check completed  Taken 6/10/2025 1300 by Sophia Newby RN  Safety Promotion/Fall Prevention:   safety round/check completed   nonskid shoes/slippers when out of bed  Intervention: Prevent Skin Injury  Description: Perform a screening for skin injury risk, such as pressure or moisture-associated skin damage on admission and at regular intervals throughout hospital stay.Keep all areas of skin (especially folds) clean and dry.Maintain adequate skin hydration.Relieve and redistribute pressure and protect bony prominences and skin at risk for injury; implement measures based on patient-specific risk factors.Match turning and repositioning schedule to clinical condition.Encourage weight shift frequently; assist with reposition if unable to complete  independently.Float heels off bed; avoid pressure on the Achilles tendon.Keep skin free from extended contact with medical devices.Optimize nutrition and hydration.Encourage functional activity and mobility, as early as tolerated.Use aids (e.g., slide boards, mechanical lift) during transfer.  Recent Flowsheet Documentation  Taken 6/10/2025 1300 by Sophia Newby RN  Body Position: position changed independently  Intervention: Prevent and Manage VTE (Venous Thromboembolism) Risk  Description: Assess for VTE (venous thromboembolism) risk.Promote early mobilization; encourage both active and passive leg exercises, if unable to ambulate.Initiate and maintain compression or other therapy, as indicated, based on identified risk in accordance with organizational protocol and provider order.Recognize the patient's individual risk for bleeding before initiating pharmacologic thromboprophylaxis.  Recent Flowsheet Documentation  Taken 6/10/2025 1300 by Sophia Newby RN  VTE Prevention/Management:   bilateral   SCDs (sequential compression devices) on  Intervention: Prevent Infection  Description: Maintain skin and mucous membrane integrity; promote hand, oral and pulmonary hygiene.Optimize fluid balance, nutrition, sleep and glycemic control to maximize infection resistance.Identify potential sources of infection early to prevent or mitigate progression of infection (e.g., wound, lines, devices).Evaluate ongoing need for invasive devices; remove promptly when no longer indicated.Review vaccination status.  Recent Flowsheet Documentation  Taken 6/10/2025 1300 by Sophia Newby RN  Infection Prevention:   visitors restricted/screened   single patient room provided   rest/sleep promoted   personal protective equipment utilized   hand hygiene promoted   equipment surfaces disinfected   environmental surveillance performed  Goal: Optimal Comfort and Wellbeing  Outcome: Progressing  Intervention: Monitor Pain and Promote  Comfort  Description: Assess pain level, treatment efficacy and patient response at regular intervals using a consistent pain scale.Consider the presence and impact of preexisting chronic pain.Encourage patient and caregiver involvement in pain assessment, interventions and safety measures.Promote activity; balance with sleep and rest to enhance healing.  Recent Flowsheet Documentation  Taken 6/10/2025 1300 by Sophia Newby RN  Pain Management Interventions: pain management plan reviewed with patient/caregiver  Intervention: Provide Person-Centered Care  Description: Use a family-focused approach to care; encourage support system presence and participation.Develop trust and rapport by proactively providing information, encouraging questions, addressing concerns and offering reassurance.Acknowledge emotional response to hospitalization.Recognize and utilize personal coping strategies and strengths; develop goals via shared decision-making.Honor spiritual and cultural preferences.  Recent Flowsheet Documentation  Taken 6/10/2025 1300 by Sophia Newby RN  Trust Relationship/Rapport:   care explained   choices provided   emotional support provided   empathic listening provided   questions answered   questions encouraged   reassurance provided   thoughts/feelings acknowledged  Goal: Readiness for Transition of Care  Outcome: Progressing     Problem:  Delivery  Goal: Bleeding is Controlled  Outcome: Progressing  Goal: Stable Fetal Wellbeing  Outcome: Progressing  Intervention: Promote and Monitor Fetal Wellbeing  Description: Provide ongoing fetal heart rate monitoring. If the continuous fetal monitoring method is used, it should be left in place until the abdomen is prepared for delivery.If a fetal spiral electrode is in place, it should be removed following abdominal preparation and before surgery is begun.Evaluate fetal heart rate before and after initiation of neuraxial anesthesia.Facilitate maternal  lateral position change to improve uteroplacental blood flow and maternal blood pressure; utilize hip wedge as needed.Monitor uterine contraction pattern; assess for presence of tachysystole.Prepare for position adjustment and administration of fluid bolus if tachysystole persists.Administer oxygen therapy in the presence of maternal hypoxia.Prepare for expedited delivery if fetal status does not improve.  Recent Flowsheet Documentation  Taken 6/10/2025 1300 by Sophia Newby RN  Body Position: position changed independently  Goal: Absence of Infection Signs and Symptoms  Outcome: Progressing  Intervention: Prevent or Manage Infection  Description: Verify Group B streptococcus status and presence of known infection.Maintain normothermia and glycemic control to maximize infection resistance.Assist with obtaining cultures, as indicated.Prepare to administer antimicrobial therapy prophylaxis within 60 minutes prior to delivery, unless there is current coverage provided by existing antimicrobial therapy regimen. If  delivery is urgently needed, prophylaxis should be administeAnticipate the need for additional antibiotic administration based on patient status, such as obesity, significant blood loss or prolonged surgical procedure.Limit digital vaginal exams or invasive vaginal procedures, especially after membranes are ruptured.Prepare for vaginal cleansing prior to delivery, particularly if membranes have ruptured and patient has been in active labor.Prepare to send placenta for histology following delivery, as indicated.Identify early signs of sepsis, such as increased heart rate and decreased blood pressure, as well as changes in mental state, respiratory pattern or peripheral perfusion.Prepare for rapid sepsis management, including lactate level, intravenous access, fluid administration and oxygen therapy.Provide fever-reduction and comfort measures.  Recent Flowsheet Documentation  Taken 6/10/2025 1300 by  Sophia Newby, RN  Infection Management: aseptic technique maintained  Infection Prevention:   visitors restricted/screened   single patient room provided   rest/sleep promoted   personal protective equipment utilized   hand hygiene promoted   equipment surfaces disinfected   environmental surveillance performed  Goal: Effective Oxygenation and Ventilation  Outcome: Progressing     Problem: Anesthesia/Analgesia, Neuraxial  Goal: Safe, Effective Infusion Delivery  Outcome: Progressing  Goal: Stable Patient-Fetal Status  Outcome: Progressing  Goal: Absence of Infection Signs and Symptoms  Outcome: Progressing  Intervention: Prevent or Manage Infection  Description: Maintain insertion site dressing and infusion delivery system integrity to reduce the risk for infection.Maintain normothermia and glycemic control to maximize infection resistance.Optimize activity and mobility, such as reposition, sit in chair and ambulation, to maximize infection resistance.Implement transmission-based precautions and isolation, as indicated, to prevent spread of infection.Obtain cultures prior to initiating antimicrobial therapy, when possible. Do not delay treatment for laboratory results in the presence of high suspicion or clinical indicators.Administer ordered antimicrobial therapy promptly; reassess need regularly.Monitor laboratory value, diagnostic test and clinical status trends for signs of infection progression.Identify early signs of sepsis, such as increased heart rate and decreased blood pressure, as well as changes in mental state, respiratory pattern or peripheral perfusionPrepare for rapid sepsis management, including lactate level, intravenous access, fluid administration and oxygen therapy.Provide fever-reduction and comfort measures.  Recent Flowsheet Documentation  Taken 6/10/2025 1300 by Sophia Newby, RN  Infection Management: aseptic technique maintained  Infection Prevention:   visitors restricted/screened    single patient room provided   rest/sleep promoted   personal protective equipment utilized   hand hygiene promoted   equipment surfaces disinfected   environmental surveillance performed  Goal: Nausea and Vomiting Relief  Outcome: Progressing  Goal: Optimal Pain Control and Function  Outcome: Progressing  Intervention: Prevent or Manage Pain  Description: Set pain management goals; mutually determine pain management plan and review plan regularly.Use a consistent, validated tool for pain assessment including function and quality of life; evaluate pain level, effect of treatment and patient's response at regular intervals.Match pharmacologic analgesia to severity and type of pain mechanism; evaluate risk for opioid use and dependence; consider multimodal approach and titrate to patient response.Provide around-the-clock dosing of pain medication to keep pain levels in control.Manage medication-induced effects, such as constipation, nausea, pruritus, urinary retention, somnolence and dizziness.For PDPH (postdural puncture headache) provide adequate fluids and bedrest; consider epidural blood patch if continued pain after conservative management.  Recent Flowsheet Documentation  Taken 6/10/2025 1300 by Sophia Newby RN  Pain Management Interventions: pain management plan reviewed with patient/caregiver  Goal: Effective Oxygenation and Ventilation  Outcome: Progressing  Intervention: Optimize Oxygenation and Ventilation  Description: Use a validated screening tool to identify risk for obstructive sleep apnea or difficult to mask ventilate prior to catheter placement.Implement continuous pulse oximetry and capnography to detect signs of hypoventilation.Assess and monitor airway, breathing and circulation; maintain close surveillance for deterioration.Elevate head of bed and position after catheter placement to minimize ventilation-perfusion mismatch and breathlessness.Monitor for medications, such as benzodiazepine or  parenteral opioid, that may increase the risk of respiratory depression.Monitor level of consciousness and response to verbal and physical stimulation.Provide respiratory support, such as oxygen therapy and positive pressure ventilation.Encourage pulmonary hygiene and lung expansion therapy, such as deep breathing, airway-clearance techniques and ambulation to minimize respiratory complication risk.  Recent Flowsheet Documentation  Taken 6/10/2025 1300 by Sophia Newby RN  Body Position: position changed independently  Head of Bed (HOB) Positioning: HOB elevated  Goal: Baseline Motor Function Return  Outcome: Progressing  Intervention: Optimize Sensorimotor Function and Safety  Description: Frequently monitor vital signs, oxygenation, dermatomes for level of anesthesia and motor function.Assess and protect skin and areas of decreased sensation from moisture, heat, pressure, friction or shearing forces.Position body with joints in neutral alignment; facilitate frequent position changes.Monitor motor strength and ensure adequate motor function prior to ambulation.Implement environmental safety measures to decrease fall risk (e.g., declutter, lighting, assistive devices); reassess risk frequently.  Recent Flowsheet Documentation  Taken 6/10/2025 1305 by Sophia Newby RN  Safety Promotion/Fall Prevention: safety round/check completed  Taken 6/10/2025 1300 by Sophia Newby RN  Safety Promotion/Fall Prevention:   safety round/check completed   nonskid shoes/slippers when out of bed  Goal: Effective Urinary Elimination  Outcome: Progressing  Intervention: Promote Effective Urine Elimination  Description: Monitor fluid balance to promote optimal hydration.Provide safe and ready access to toileting devices and aids, such as a commode.Promote behavioral methods to enhance voiding ability, that may include relaxation techniques, mutually established regular elimination schedule, adequate time for bladder emptying, as  well as positioning to optimize flow.Implement methods to facilitate elimination (e.g., running water, warm water over perineum, sitz bath, privacy).Utilize portable bladder ultrasound to assess pre- and postvoid volumes.Consider potential contributing factors (e.g., lack of privacy, medication, immobility, constipation).Anticipate the need for intermittent catheterization if other methods are unsuccessful and bladder ultrasound reveals large volume.Facilitate urogenital hygiene to maintain perineal skin integrity.  Recent Flowsheet Documentation  Taken 6/10/2025 1300 by Sophia Newby RN  Urinary Elimination Promotion: catheter patency maintained   Goal Outcome Evaluation:

## 2025-06-11 LAB
HCT VFR BLD AUTO: 29.5 % (ref 34–46.6)
HGB BLD-MCNC: 9.4 G/DL (ref 12–15.9)

## 2025-06-11 PROCEDURE — 85014 HEMATOCRIT: CPT | Performed by: OBSTETRICS & GYNECOLOGY

## 2025-06-11 PROCEDURE — 85018 HEMOGLOBIN: CPT | Performed by: OBSTETRICS & GYNECOLOGY

## 2025-06-11 PROCEDURE — 25010000002 ENOXAPARIN PER 10 MG: Performed by: OBSTETRICS & GYNECOLOGY

## 2025-06-11 PROCEDURE — 25010000002 KETOROLAC TROMETHAMINE PER 15 MG: Performed by: OBSTETRICS & GYNECOLOGY

## 2025-06-11 RX ORDER — FERROUS SULFATE 325(65) MG
325 TABLET ORAL
Status: DISCONTINUED | OUTPATIENT
Start: 2025-06-11 | End: 2025-06-12 | Stop reason: HOSPADM

## 2025-06-11 RX ADMIN — ACETAMINOPHEN 1000 MG: 500 TABLET ORAL at 09:36

## 2025-06-11 RX ADMIN — POLYETHYLENE GLYCOL (3350) 17 G: 17 POWDER, FOR SOLUTION ORAL at 09:36

## 2025-06-11 RX ADMIN — FERROUS SULFATE TAB 325 MG (65 MG ELEMENTAL FE) 325 MG: 325 (65 FE) TAB at 12:52

## 2025-06-11 RX ADMIN — Medication 1 TABLET: at 22:00

## 2025-06-11 RX ADMIN — IBUPROFEN 600 MG: 600 TABLET, FILM COATED ORAL at 19:55

## 2025-06-11 RX ADMIN — KETOROLAC TROMETHAMINE 15 MG: 30 INJECTION, SOLUTION INTRAMUSCULAR; INTRAVENOUS at 00:11

## 2025-06-11 RX ADMIN — KETOROLAC TROMETHAMINE 15 MG: 30 INJECTION, SOLUTION INTRAMUSCULAR; INTRAVENOUS at 12:51

## 2025-06-11 RX ADMIN — ENOXAPARIN SODIUM 40 MG: 40 INJECTION SUBCUTANEOUS at 12:52

## 2025-06-11 RX ADMIN — ACETAMINOPHEN 1000 MG: 500 TABLET ORAL at 03:36

## 2025-06-11 RX ADMIN — KETOROLAC TROMETHAMINE 15 MG: 30 INJECTION, SOLUTION INTRAMUSCULAR; INTRAVENOUS at 06:19

## 2025-06-11 RX ADMIN — ACETAMINOPHEN 650 MG: 325 TABLET ORAL at 22:00

## 2025-06-11 NOTE — LACTATION NOTE
Checked on mom and infant. Mom stated breastfeeding is going well. No other needs at this time.  All questions answered at this time. PRN Lactation Consultant/Clinic contact encouraged

## 2025-06-11 NOTE — PLAN OF CARE
Problem: Adult Inpatient Plan of Care  Goal: Plan of Care Review  Outcome: Progressing  Flowsheets (Taken 2025 0450)  Progress: improving  Plan of Care Reviewed With: patient  Goal: Patient-Specific Goal (Individualized)  Outcome: Progressing  Goal: Absence of Hospital-Acquired Illness or Injury  Outcome: Progressing  Intervention: Identify and Manage Fall Risk  Recent Flowsheet Documentation  Taken 2025 0400 by Alejandra Newman RN  Safety Promotion/Fall Prevention: safety round/check completed  Taken 2025 0300 by Alejandra Newman RN  Safety Promotion/Fall Prevention: safety round/check completed  Taken 2025 0100 by Alejandra Newman RN  Safety Promotion/Fall Prevention: safety round/check completed  Taken 6/10/2025 2300 by Alejandra Newman RN  Safety Promotion/Fall Prevention: safety round/check completed  Taken 6/10/2025 2100 by Alejandra Newman RN  Safety Promotion/Fall Prevention: safety round/check completed  Taken 6/10/2025 1950 by Alejandra Newman RN  Safety Promotion/Fall Prevention: safety round/check completed  Taken 6/10/2025 1900 by Alejandra Newman RN  Safety Promotion/Fall Prevention: safety round/check completed  Intervention: Prevent Infection  Recent Flowsheet Documentation  Taken 6/10/2025 1950 by Alejandra Newman RN  Infection Prevention:   rest/sleep promoted   hand hygiene promoted  Goal: Optimal Comfort and Wellbeing  Outcome: Progressing  Intervention: Provide Person-Centered Care  Recent Flowsheet Documentation  Taken 6/10/2025 1950 by Alejandra Newman RN  Trust Relationship/Rapport:   care explained   thoughts/feelings acknowledged   questions answered   questions encouraged  Goal: Readiness for Transition of Care  Outcome: Progressing     Problem:  Delivery  Goal: Bleeding is Controlled  Outcome: Progressing  Goal: Stable Fetal Wellbeing  Outcome: Progressing  Goal: Absence of Infection Signs and Symptoms  Outcome: Progressing  Intervention: Prevent or Manage Infection  Recent  Flowsheet Documentation  Taken 6/10/2025 1950 by Alejandra Newman RN  Infection Prevention:   rest/sleep promoted   hand hygiene promoted  Goal: Effective Oxygenation and Ventilation  Outcome: Progressing     Problem: Anesthesia/Analgesia, Neuraxial  Goal: Safe, Effective Infusion Delivery  Outcome: Progressing  Goal: Stable Patient-Fetal Status  Outcome: Progressing  Goal: Absence of Infection Signs and Symptoms  Outcome: Progressing  Intervention: Prevent or Manage Infection  Recent Flowsheet Documentation  Taken 6/10/2025 1950 by Alejandra Newman RN  Infection Prevention:   rest/sleep promoted   hand hygiene promoted  Goal: Nausea and Vomiting Relief  Outcome: Progressing  Goal: Optimal Pain Control and Function  Outcome: Progressing  Goal: Effective Oxygenation and Ventilation  Outcome: Progressing  Goal: Baseline Motor Function Return  Outcome: Progressing  Intervention: Optimize Sensorimotor Function and Safety  Recent Flowsheet Documentation  Taken 6/11/2025 0400 by Alejandra Newman RN  Safety Promotion/Fall Prevention: safety round/check completed  Taken 6/11/2025 0300 by Alejandra Newman RN  Safety Promotion/Fall Prevention: safety round/check completed  Taken 6/11/2025 0100 by Alejandra Newman RN  Safety Promotion/Fall Prevention: safety round/check completed  Taken 6/10/2025 2300 by Alejandra Newman RN  Safety Promotion/Fall Prevention: safety round/check completed  Taken 6/10/2025 2100 by Alejandra Newman RN  Safety Promotion/Fall Prevention: safety round/check completed  Taken 6/10/2025 1950 by Alejandra Newman RN  Safety Promotion/Fall Prevention: safety round/check completed  Taken 6/10/2025 1900 by Alejandra Newman RN  Safety Promotion/Fall Prevention: safety round/check completed  Goal: Effective Urinary Elimination  Outcome: Progressing   Goal Outcome Evaluation:  Plan of Care Reviewed With: patient        Progress: improving

## 2025-06-11 NOTE — PROGRESS NOTES
" [facility]   PROGRESS NOTE    Post-Op Day 1 S/P   Subjective   Subjective  Patient reports:  Pain is controlled with prescription NSAID's including ibuprofen (Motrin) and narcotic analgesics including hydrocodone/acetaminophen (Lorcet, Lortab, Norco, Vicodin).  She is up out of bed this am. Tolerating diet. Tolerating po -- normal. Voiding - without difficulty; flatus reported..  Vaginal bleeding is as much as expected.    Objective    Objective:  Vital signs (most recent): Blood pressure 104/64, pulse 97, temperature 98.3 °F (36.8 °C), temperature source Oral, resp. rate 18, height 162.6 cm (64\"), weight 120 kg (265 lb 1.6 oz), SpO2 98%, currently breastfeeding.       Vitals: Vital Signs Range for the last 24 hours  Temperature: Temp:  [97.8 °F (36.6 °C)-98.8 °F (37.1 °C)] 98.3 °F (36.8 °C)   Temp Source: Temp src: Oral   BP: BP: ()/(50-86) 104/64   Pulse: Heart Rate:  [] 97   Respirations: Resp:  [16-20] 18   Weight:              Physical Exam    Lungs clear to auscultation bilaterally   Abdomen Soft, ND, tender along incision. + BS   Incision  Clean, dry, intact   Extremities extremities normal, atraumatic, no cyanosis or edema         [unfilled]       Lab Results   Component Value Date    ABO A 06/10/2025    RH Positive 06/10/2025        Lab Results   Component Value Date    HGB 9.4 (L) 2025    HCT 29.5 (L) 2025       Assessment & Plan        Gestational diabetes mellitus (GDM) affecting pregnancy    Assessment & Plan    Assessment:    Joanna Hernandez is Day 1  post-partum  , Low Transverse  .    Anemia - start iron.   Plan:  continue postop care.      Angelique Menendez DO  25  10:20 EDT    "

## 2025-06-11 NOTE — PLAN OF CARE
Problem: Adult Inpatient Plan of Care  Goal: Plan of Care Review  Outcome: Progressing  Flowsheets  Taken 2025 1447 by Genesis Tolentino RN  Outcome Evaluation: good pain control angelic wnl  Taken 2025 0450 by Alejandra Newman, RN  Progress: improving  Plan of Care Reviewed With: patient  Goal: Patient-Specific Goal (Individualized)  Outcome: Progressing  Goal: Absence of Hospital-Acquired Illness or Injury  Outcome: Progressing  Intervention: Identify and Manage Fall Risk  Recent Flowsheet Documentation  Taken 2025 08 by Genesis Tolentino RN  Safety Promotion/Fall Prevention: safety round/check completed  Taken 2025 0710 by Genesis Tolentino RN  Safety Promotion/Fall Prevention: safety round/check completed  Goal: Optimal Comfort and Wellbeing  Outcome: Progressing  Intervention: Monitor Pain and Promote Comfort  Recent Flowsheet Documentation  Taken 2025 08 by Genesis Tolentino RN  Pain Management Interventions: pain management plan reviewed with patient/caregiver  Intervention: Provide Person-Centered Care  Recent Flowsheet Documentation  Taken 2025 08 by Genesis Tolentino RN  Trust Relationship/Rapport:   care explained   choices provided   emotional support provided   questions answered  Goal: Readiness for Transition of Care  Outcome: Progressing     Problem:  Delivery  Goal: Bleeding is Controlled  Outcome: Progressing  Goal: Stable Fetal Wellbeing  Outcome: Progressing  Goal: Absence of Infection Signs and Symptoms  Outcome: Progressing  Goal: Effective Oxygenation and Ventilation  Outcome: Progressing     Problem: Anesthesia/Analgesia, Neuraxial  Goal: Safe, Effective Infusion Delivery  Outcome: Progressing  Goal: Stable Patient-Fetal Status  Outcome: Progressing  Goal: Absence of Infection Signs and Symptoms  Outcome: Progressing  Goal: Nausea and Vomiting Relief  Outcome: Progressing  Goal: Optimal Pain Control and Function  Outcome:  Progressing  Intervention: Prevent or Manage Pain  Recent Flowsheet Documentation  Taken 6/11/2025 0800 by Genesis Tolentino, RN  Pain Management Interventions: pain management plan reviewed with patient/caregiver  Goal: Effective Oxygenation and Ventilation  Outcome: Progressing  Goal: Baseline Motor Function Return  Outcome: Progressing  Intervention: Optimize Sensorimotor Function and Safety  Recent Flowsheet Documentation  Taken 6/11/2025 0800 by Genesis Tolentino, RN  Safety Promotion/Fall Prevention: safety round/check completed  Taken 6/11/2025 0710 by Genesis Tolentino, RN  Safety Promotion/Fall Prevention: safety round/check completed  Goal: Effective Urinary Elimination  Outcome: Progressing  Intervention: Promote Effective Urine Elimination  Recent Flowsheet Documentation  Taken 6/11/2025 0800 by Genesis Tolentino, RN  Urinary Elimination Promotion: frequent voiding encouraged   Goal Outcome Evaluation:              Outcome Evaluation: good pain control angelic amaya                              printed

## 2025-06-12 VITALS
DIASTOLIC BLOOD PRESSURE: 70 MMHG | BODY MASS INDEX: 45.26 KG/M2 | HEIGHT: 64 IN | OXYGEN SATURATION: 97 % | SYSTOLIC BLOOD PRESSURE: 129 MMHG | WEIGHT: 265.1 LBS | HEART RATE: 87 BPM | RESPIRATION RATE: 18 BRPM | TEMPERATURE: 98.1 F

## 2025-06-12 PROBLEM — Z30.2 ENCOUNTER FOR STERILIZATION: Status: ACTIVE | Noted: 2025-06-12

## 2025-06-12 PROCEDURE — 25010000002 ENOXAPARIN PER 10 MG: Performed by: OBSTETRICS & GYNECOLOGY

## 2025-06-12 RX ORDER — DOCUSATE SODIUM 100 MG/1
100 CAPSULE, LIQUID FILLED ORAL 2 TIMES DAILY
Qty: 60 CAPSULE | Refills: 1 | Status: SHIPPED | OUTPATIENT
Start: 2025-06-12

## 2025-06-12 RX ORDER — IBUPROFEN 600 MG/1
600 TABLET, FILM COATED ORAL EVERY 6 HOURS PRN
Qty: 40 TABLET | Refills: 0 | Status: SHIPPED | OUTPATIENT
Start: 2025-06-12

## 2025-06-12 RX ORDER — HYDROCODONE BITARTRATE AND ACETAMINOPHEN 5; 325 MG/1; MG/1
1 TABLET ORAL EVERY 6 HOURS PRN
Qty: 20 TABLET | Refills: 0 | Status: SHIPPED | OUTPATIENT
Start: 2025-06-12

## 2025-06-12 RX ADMIN — IBUPROFEN 600 MG: 600 TABLET, FILM COATED ORAL at 00:25

## 2025-06-12 RX ADMIN — IBUPROFEN 600 MG: 600 TABLET, FILM COATED ORAL at 15:12

## 2025-06-12 RX ADMIN — POLYETHYLENE GLYCOL (3350) 17 G: 17 POWDER, FOR SOLUTION ORAL at 08:25

## 2025-06-12 RX ADMIN — ENOXAPARIN SODIUM 40 MG: 40 INJECTION SUBCUTANEOUS at 15:12

## 2025-06-12 RX ADMIN — FERROUS SULFATE TAB 325 MG (65 MG ELEMENTAL FE) 325 MG: 325 (65 FE) TAB at 08:24

## 2025-06-12 RX ADMIN — ACETAMINOPHEN 650 MG: 325 TABLET ORAL at 09:55

## 2025-06-12 RX ADMIN — ENOXAPARIN SODIUM 40 MG: 40 INJECTION SUBCUTANEOUS at 00:25

## 2025-06-12 RX ADMIN — ACETAMINOPHEN 650 MG: 325 TABLET ORAL at 15:12

## 2025-06-12 RX ADMIN — IBUPROFEN 600 MG: 600 TABLET, FILM COATED ORAL at 08:24

## 2025-06-12 NOTE — PLAN OF CARE
Problem: Adult Inpatient Plan of Care  Goal: Plan of Care Review  Outcome: Progressing  Flowsheets (Taken 2025 0402)  Progress: improving  Plan of Care Reviewed With: patient  Goal: Patient-Specific Goal (Individualized)  Outcome: Progressing  Goal: Absence of Hospital-Acquired Illness or Injury  Outcome: Progressing  Intervention: Identify and Manage Fall Risk  Recent Flowsheet Documentation  Taken 2025 0100 by Alejandra Newman RN  Safety Promotion/Fall Prevention: safety round/check completed  Taken 2025 2300 by Alejandra Newman RN  Safety Promotion/Fall Prevention: safety round/check completed  Taken 2025 2100 by Alejandra Newman RN  Safety Promotion/Fall Prevention: safety round/check completed  Taken 2025 195 by Alejandra Newman RN  Safety Promotion/Fall Prevention: safety round/check completed  Taken 2025 1900 by Alejandra Newman RN  Safety Promotion/Fall Prevention: safety round/check completed  Intervention: Prevent Infection  Recent Flowsheet Documentation  Taken 2025 by Alejandra Newman RN  Infection Prevention:   hand hygiene promoted   rest/sleep promoted  Goal: Optimal Comfort and Wellbeing  Outcome: Progressing  Goal: Readiness for Transition of Care  Outcome: Progressing     Problem:  Delivery  Goal: Bleeding is Controlled  Outcome: Progressing  Goal: Stable Fetal Wellbeing  Outcome: Progressing  Goal: Absence of Infection Signs and Symptoms  Outcome: Progressing  Intervention: Prevent or Manage Infection  Recent Flowsheet Documentation  Taken 2025 by Alejandra Newman RN  Infection Prevention:   hand hygiene promoted   rest/sleep promoted  Goal: Effective Oxygenation and Ventilation  Outcome: Progressing     Problem: Anesthesia/Analgesia, Neuraxial  Goal: Safe, Effective Infusion Delivery  Outcome: Progressing  Goal: Stable Patient-Fetal Status  Outcome: Progressing  Goal: Absence of Infection Signs and Symptoms  Outcome: Progressing  Intervention:  Prevent or Manage Infection  Recent Flowsheet Documentation  Taken 6/11/2025 1955 by Alejandra Newman RN  Infection Prevention:   hand hygiene promoted   rest/sleep promoted  Goal: Nausea and Vomiting Relief  Outcome: Progressing  Goal: Optimal Pain Control and Function  Outcome: Progressing  Goal: Effective Oxygenation and Ventilation  Outcome: Progressing  Goal: Baseline Motor Function Return  Outcome: Progressing  Intervention: Optimize Sensorimotor Function and Safety  Recent Flowsheet Documentation  Taken 6/12/2025 0100 by Alejandra Newman RN  Safety Promotion/Fall Prevention: safety round/check completed  Taken 6/11/2025 2300 by Alejandra Newman RN  Safety Promotion/Fall Prevention: safety round/check completed  Taken 6/11/2025 2100 by Alejandra Newman RN  Safety Promotion/Fall Prevention: safety round/check completed  Taken 6/11/2025 1955 by Alejandra Newman RN  Safety Promotion/Fall Prevention: safety round/check completed  Taken 6/11/2025 1900 by Alejandra Newman RN  Safety Promotion/Fall Prevention: safety round/check completed  Goal: Effective Urinary Elimination  Outcome: Progressing   Goal Outcome Evaluation:  Plan of Care Reviewed With: patient        Progress: improving

## 2025-06-12 NOTE — LACTATION NOTE
Discharge instruction given and explained to mom and dad about mom's postpartum care and infant care instructions. Instructed mom and dad on Infant CPR and choking. Mom and dad demonstrated and verbalized understanding. All questions answered, no other needs at this time. All questions answered at this time; PRN Lactation Consultant contact encouraged.

## 2025-06-12 NOTE — DISCHARGE SUMMARY
"[facility]  Delivery Discharge Summary    Primary OB Clinician:     EDC: Estimated Date of Delivery: 25    Gestational Age:37w4d    Antepartum complications: gestational diabetes    Date of Delivery: 6/10/2025  Time of Delivery: 10:47 AM    Delivered By:  Angelique Menendez    Delivery Type: , Low Transverse     Tubal Ligation: done with c/section    Baby:male infant;   Apgar:  8  @ 1 minute /   Apgar:  9  @ 5 minutes   Weight: 3430 g (7 lb 9 oz)     Anesthesia: Spinal     Intrapartum complications: Gestational Diabetes, insulin controlled    [unfilled]       Lab Results   Component Value Date    ABO A 06/10/2025    RH Positive 06/10/2025        Lab Results   Component Value Date    HGB 9.4 (L) 2025    HCT 29.5 (L) 2025       Subjective   Subjective  Postpartum Day 2: C/S with BTL Delivery    The patient feels well.  Her pain is well controlled with nonsteroidal anti-inflammatory drugs and prescribed pain medications.   She is ambulating well.  Patient describes her bleeding as thin lochia.        Objective     Objective:  Vital signs (most recent): Blood pressure 129/70, pulse 87, temperature 98.1 °F (36.7 °C), temperature source Oral, resp. rate 18, height 162.6 cm (64\"), weight 120 kg (265 lb 1.6 oz), SpO2 97%, currently breastfeeding.     Vital Signs Range for the last 24 hours  Temperature: Temp:  [98.1 °F (36.7 °C)] 98.1 °F (36.7 °C)   Temp Source: Temp src: Oral   BP: BP: (116-129)/(68-70) 129/70   Pulse: Heart Rate:  [87-98] 87   Respirations: Resp:  [18] 18   Weight:       Admit Height:  Height: 162.6 cm (64\")    Physical Exam:  General:  no acute distresss.  Abdomen: Fundus: appropriate, firm, non tender Incision c/d/i  Extremities: normal, atraumatic, no cyanosis, and trace edema.         Assesment and Plan:    POD 2 s/p C/S and BTL via filshie clips.   Follow-up appointment with [facility] in 3 weeks.    Discharge Date: 2025; Discharge Time: 12:27 " EDT        Angelique Menendez DO  6/12/2025  12:26 EDT

## 2025-06-13 ENCOUNTER — MATERNAL SCREENING (OUTPATIENT)
Dept: CALL CENTER | Facility: HOSPITAL | Age: 35
End: 2025-06-13
Payer: MEDICAID

## 2025-06-13 NOTE — OUTREACH NOTE
Maternal Screening Survey      Flowsheet Row Responses   Eligibility Eligible   Prep survey completed? Yes   Facility patient discharged from? [facility] - Registered Nurse

## 2025-06-23 ENCOUNTER — MATERNAL SCREENING (OUTPATIENT)
Dept: CALL CENTER | Facility: HOSPITAL | Age: 35
End: 2025-06-23
Payer: MEDICAID

## 2025-06-23 NOTE — OUTREACH NOTE
Maternal Screening Survey      Flowsheet Row Responses   Facility patient discharged from? [facility]   Attempt successful? No   Unsuccessful attempts Attempt 1              [facility] - Registered Nurse              [facility] - Registered Nurse

## 2025-06-23 NOTE — OUTREACH NOTE
Maternal Screening Survey      Flowsheet Row Responses   Facility patient discharged from? [facility]   Attempt successful? Yes   Call start time 1430   Call end time 1432   Person spoke with today (if not patient) and relationship Patient   I have been able to laugh and see the funny side of things. 0   I have looked forward with enjoyment to things. 0   I have blamed myself unnecessarily when things went wrong. 0   I have been anxious or worried for no good reason. 0   I have felt scared or panicky for no good reason. 0   Things have been getting on top of me. 0   I have been so unhappy that I have had difficulty sleeping. 0   I have felt sad or miserable. 0   I have been so unhappy that I have been crying. 0   The thought of harming myself has occurred to me. 0   [place]  Depression Scale Total 0   Did any of your parents have problems with alcohol or drug use? No   Do any of your peers have problems with alcohol or drug use? No   Does your partner have problems with alcohol or drug use? No   Before you were pregnant did you have problems with alcohol or drug use? (past) No   In the past month, did you drink beer, wine, liquor or use any other drugs? (pregnancy) No   Maternal Screening call completed Yes   Call end time 1432              [facility] - Registered Nurse              [facility] - Registered Nurse

## (undated) DEVICE — HYDROGEL COATED LATEX URINE METER FOLEY TRAY,16 FR/CH (5.3 MM), 5 ML CATHETER PRE-CONNECTED TO 2000 ML DRAINAGE BAG WITH NEEDLE SAMPLING: Brand: DOVER

## (undated) DEVICE — BG RESUSCITATOR INFANT BABYBLUE2

## (undated) DEVICE — TRY SPINE PENCAN 24GA X4IN

## (undated) DEVICE — PK C/SECT 70

## (undated) DEVICE — ADH SURG SKIN AFFIX .4ML

## (undated) DEVICE — SLV SCD CALF HEMOFORCE DVT THERP REPR LG

## (undated) DEVICE — CUFF SCD HEMOFORCE SEQ CALF STD MD

## (undated) DEVICE — SKIN AFFIX SURG ADHESIVE 72/CS 0.55ML: Brand: MEDLINE

## (undated) DEVICE — APPL CHLORAPREP W/TINT 26ML ORNG

## (undated) DEVICE — TRY FOLEY URINE METER 16 5 CS